# Patient Record
Sex: FEMALE | Race: WHITE | NOT HISPANIC OR LATINO | Employment: FULL TIME | URBAN - METROPOLITAN AREA
[De-identification: names, ages, dates, MRNs, and addresses within clinical notes are randomized per-mention and may not be internally consistent; named-entity substitution may affect disease eponyms.]

---

## 2021-04-08 DIAGNOSIS — Z23 ENCOUNTER FOR IMMUNIZATION: ICD-10-CM

## 2022-06-03 ENCOUNTER — OFFICE VISIT (OUTPATIENT)
Dept: URGENT CARE | Facility: CLINIC | Age: 22
End: 2022-06-03
Payer: COMMERCIAL

## 2022-06-03 VITALS
HEART RATE: 89 BPM | TEMPERATURE: 98 F | DIASTOLIC BLOOD PRESSURE: 77 MMHG | SYSTOLIC BLOOD PRESSURE: 124 MMHG | WEIGHT: 210 LBS | OXYGEN SATURATION: 99 % | RESPIRATION RATE: 18 BRPM | HEIGHT: 63 IN | BODY MASS INDEX: 37.21 KG/M2

## 2022-06-03 DIAGNOSIS — Z48.02 ENCOUNTER FOR REMOVAL OF SUTURES: Primary | ICD-10-CM

## 2022-06-03 PROCEDURE — S9083 URGENT CARE CENTER GLOBAL: HCPCS

## 2022-06-03 PROCEDURE — G0382 LEV 3 HOSP TYPE B ED VISIT: HCPCS

## 2022-06-03 RX ORDER — FLUOXETINE HYDROCHLORIDE 20 MG/1
CAPSULE ORAL
COMMUNITY
Start: 2022-05-31

## 2022-06-03 RX ORDER — PROPRANOLOL HYDROCHLORIDE 10 MG/1
TABLET ORAL
COMMUNITY
Start: 2022-04-05

## 2022-06-03 RX ORDER — NORGESTIMATE AND ETHINYL ESTRADIOL 0.25-0.035
1 KIT ORAL DAILY
COMMUNITY
Start: 2022-05-04

## 2022-06-03 RX ORDER — HYDROXYZINE HYDROCHLORIDE 25 MG/1
TABLET, FILM COATED ORAL
COMMUNITY
Start: 2022-04-30

## 2022-06-03 RX ORDER — FLUOXETINE 10 MG/1
CAPSULE ORAL
COMMUNITY
Start: 2022-05-30

## 2022-06-03 NOTE — PROGRESS NOTES
[facility]        NAME: Freya Burnett is a 22 y o  female  : 2000    MRN: [id number]  DATE: Tania 3, 2022  TIME: 11:29 AM    Assessment and Plan   Encounter for removal of sutures [Z48 02]  1  Encounter for removal of sutures  Suture removal         Patient Instructions     Keep wound clean and dry  Follow up with PCP  Chief Complaint     Chief Complaint   Patient presents with    Suture / Staple Removal     Stiches were placed last Friday at [facility]         History of Present Illness       HPI   Freya Burnett is a 22 y o  female presents today for suture removal of her right thumb  Patient cut her thumb with a piece of metal 1 week ago and had 3 sutures placed at [facility]  Patient denies any thumb pain, drainage, redness, or fevers  She has been keeping the wound clean and dry occasionally wearing a Band-Aid, but mostly keeping open to air  Patient reports she is up-to-date on her tetanus  Review of Systems   Review of Systems   Constitutional: Negative for chills and fever  Musculoskeletal: Negative for arthralgias and joint swelling  Skin: Positive for wound  Negative for color change and rash  Neurological: Negative for weakness and numbness  Current Medications       Current Outpatient Medications:     FLUoxetine (PROzac) 10 mg capsule, , Disp: , Rfl:     FLUoxetine (PROzac) 20 mg capsule, , Disp: , Rfl:     hydrOXYzine HCL (ATARAX) 25 mg tablet, TAKE 1 TABLET (25 MG TOTAL) BY MOUTH 2 (TWO) TIMES A DAY AS NEEDED FOR ANXIETY (INSOMNIA)  , Disp: , Rfl:     Mel 0 25-35 MG-MCG per tablet, Take 1 tablet by mouth daily, Disp: , Rfl:     propranolol (INDERAL) 10 mg tablet, TAKE 1 TABLET BY MOUTH 2 TIMES A DAY AS NEEDED (ANXIETY)  , Disp: , Rfl:     Current Allergies     Allergies as of 2022 - Reviewed 2022   Allergen Reaction Noted    Cefdinir Rash 10/13/2015            The following portions of the patient's history were reviewed and updated as appropriate: allergies, current medications, past family history, past medical history, past social history, past surgical history and problem list      History reviewed  No pertinent past medical history  History reviewed  No pertinent surgical history  History reviewed  No pertinent family history  Medications have been verified  Objective   /77   Pulse 89   Temp 98 °F (36 7 °C)   Resp 18   Ht 5' 3" (1 6 m)   Wt 95 3 kg (210 lb)   SpO2 99%   BMI 37 20 kg/m²        Physical Exam     Physical Exam  Vitals and nursing note reviewed  Constitutional:       General: She is not in acute distress  Appearance: Normal appearance  Cardiovascular:      Rate and Rhythm: Normal rate and regular rhythm  Heart sounds: Normal heart sounds  Pulmonary:      Effort: Pulmonary effort is normal       Breath sounds: Normal breath sounds  No wheezing, rhonchi or rales  Skin:     General: Skin is warm and dry  Capillary Refill: Capillary refill takes less than 2 seconds  Findings: Laceration present  Psychiatric:         Behavior: Behavior normal  Behavior is cooperative  Suture removal    Date/Time: 6/3/2022 11:26 AM  Performed by: SUSSY Odonnell  Authorized by: SUSSY Odonnell   Universal Protocol:  Consent: Verbal consent obtained  Risks and benefits: risks, benefits and alternatives were discussed  Consent given by: patient  Time out: Immediately prior to procedure a "time out" was called to verify the correct patient, procedure, equipment, support staff and site/side marked as required    Patient understanding: patient states understanding of the procedure being performed  Patient consent: the patient's understanding of the procedure matches consent given  Procedure consent: procedure consent matches procedure scheduled  Relevant documents: relevant documents present and verified  Site marked: the operative site was marked  Patient identity confirmed: verbally with patient        Patient location:  Bedside  Location:     Laterality:  Right    Location:  Upper extremity    Upper extremity location:  Hand    Hand location:  R thumb  Procedure details: Tools used:  Suture removal kit    Wound appearance:  No sign(s) of infection, good wound healing, clean and pink    Number of sutures removed:  3  Post-procedure details:     Post-removal:  No dressing applied    Patient tolerance of procedure:   Tolerated well, no immediate complications

## 2022-09-14 ENCOUNTER — OFFICE VISIT (OUTPATIENT)
Dept: URGENT CARE | Facility: CLINIC | Age: 22
End: 2022-09-14
Payer: COMMERCIAL

## 2022-09-14 VITALS
TEMPERATURE: 97.3 F | HEART RATE: 86 BPM | HEIGHT: 64 IN | WEIGHT: 220 LBS | OXYGEN SATURATION: 98 % | BODY MASS INDEX: 37.56 KG/M2 | RESPIRATION RATE: 18 BRPM

## 2022-09-14 DIAGNOSIS — R50.9 FEVER, UNSPECIFIED FEVER CAUSE: ICD-10-CM

## 2022-09-14 DIAGNOSIS — J06.9 ACUTE URI: Primary | ICD-10-CM

## 2022-09-14 PROCEDURE — 99213 OFFICE O/P EST LOW 20 MIN: CPT | Performed by: PHYSICIAN ASSISTANT

## 2022-09-14 PROCEDURE — 87636 SARSCOV2 & INF A&B AMP PRB: CPT | Performed by: PHYSICIAN ASSISTANT

## 2022-09-14 PROCEDURE — S9088 SERVICES PROVIDED IN URGENT: HCPCS | Performed by: PHYSICIAN ASSISTANT

## 2022-09-14 NOTE — PATIENT INSTRUCTIONS
Infection appears viral   Recommend symptomatic treatment  Can take ibuprofen or tylenol as needed for pain or fever  Over the counter cough and cold medications to help with symptoms  Use salt water gargles for sore throat and throat lozenges  Cough drops as needed  Wash hands frequently to prevent the spread of infection  If not improving over the next 7-10 days, follow up with PCP  Symptoms may persist for 10-14 days  Quarantine until test results are back   Vitamin D3 2000 IU daily  Vitamin C 1g every 12 hours  Multivitamin daily  Fluids and rest  Flonase 2 sprays each nostril once daily  Zyrtec   Over the counter cold medication as needed such as mucinex  Ibuprofen and/or tylenol for fever, body aches  Follow up with PCP upon confirmation of a positive covid test   Proceed to  ER if symptoms worsen  Stay home except to get medical care    People who are mildly ill with COVID-19 are able to isolate at home during their illness  You should restrict activities outside your home, except for getting medical care  Do not go to work, school, or public areas  Avoid using public transportation, ride-sharing, or taxis  Separate yourself from other people     As much as possible, you should stay in a specific room and away from other people in your home  Also, you should use a separate bathroom, if available  Call ahead before visiting your doctor    If you have a medical appointment, call the healthcare provider and tell them that you have or may have COVID-19  This will help the healthcare providers office take steps to keep other people from getting infected or exposed  Wear a facemask    You should wear a facemask when you are around other people (e g , sharing a room or vehicle) or pets and before you enter a healthcare providers office   If you are not able to wear a facemask (for example, because it causes trouble breathing), then people who live with you should not stay in the same room with you, or they should wear a facemask if they enter your room  Monitor your symptoms    Seek prompt medical attention if your illness is worsening (e g , difficulty breathing)  Before seeking care, call your healthcare provider and tell them that you have, or are being evaluated for, COVID-19  Put on a facemask before you enter the facility  These steps will help the healthcare providers office to keep other people in the office or waiting room from getting infected or exposed  Ask your healthcare provider to call the local or state health department  Persons who are placed under active monitoring or facilitated self-monitoring should follow instructions provided by their local health department or occupational health professionals, as appropriate  If you have a medical emergency and need to call 911, notify the dispatch personnel that you have, or are being evaluated for COVID-19  If possible, put on a facemask before emergency medical services arrive  Please follow Quarantine instructions provided in your work or school note

## 2022-09-14 NOTE — LETTER
[place] [address]  Dept: [phone]    September 14, 2022    Patient: Alban Chisholm  YOB: 2000    Alban Chisholm was seen and evaluated at our [facility]  Please note if Covid and Flu tests are negative, they may return to work and/or school when fever free for 24 hours without the use of a fever reducing agent  If Covid or Flu test is positive, they may return to work and/or on 9/17/22, as this is 5 days from the onset of symptoms  Upon return, they must then adhere to strict masking for an additional 5 days      Sincerely,    Kwan Shaikh PA-C

## 2022-09-14 NOTE — PROGRESS NOTES
[facility]    NAME: Teresa Lane is a 22 y o  female  : 2000    MRN: [id number]  DATE: 2022  TIME: 3:00 PM    Assessment and Plan   Acute URI [J06 9]  1  Acute URI     2  Fever, unspecified fever cause  Covid/Flu-Office Collect       Patient Instructions   Patient Instructions   Infection appears viral   Recommend symptomatic treatment  Can take ibuprofen or tylenol as needed for pain or fever  Over the counter cough and cold medications to help with symptoms  Use salt water gargles for sore throat and throat lozenges  Cough drops as needed  Wash hands frequently to prevent the spread of infection  If not improving over the next 7-10 days, follow up with PCP  Symptoms may persist for 10-14 days  Quarantine until test results are back   Vitamin D3 2000 IU daily  Vitamin C 1g every 12 hours  Multivitamin daily  Fluids and rest  Flonase 2 sprays each nostril once daily  Zyrtec   Over the counter cold medication as needed such as mucinex  Ibuprofen and/or tylenol for fever, body aches  Follow up with PCP upon confirmation of a positive covid test   Proceed to  ER if symptoms worsen  Stay home except to get medical care    People who are mildly ill with COVID-19 are able to isolate at home during their illness  You should restrict activities outside your home, except for getting medical care  Do not go to work, school, or public areas  Avoid using public transportation, ride-sharing, or taxis  Separate yourself from other people     As much as possible, you should stay in a specific room and away from other people in your home  Also, you should use a separate bathroom, if available  Call ahead before visiting your doctor    If you have a medical appointment, call the healthcare provider and tell them that you have or may have COVID-19  This will help the healthcare providers office take steps to keep other people from getting infected or exposed      Wear a facemask    You should wear a facemask when you are around other people (e g , sharing a room or vehicle) or pets and before you enter a healthcare providers office  If you are not able to wear a facemask (for example, because it causes trouble breathing), then people who live with you should not stay in the same room with you, or they should wear a facemask if they enter your room  Monitor your symptoms    Seek prompt medical attention if your illness is worsening (e g , difficulty breathing)  Before seeking care, call your healthcare provider and tell them that you have, or are being evaluated for, COVID-19  Put on a facemask before you enter the facility  These steps will help the healthcare providers office to keep other people in the office or waiting room from getting infected or exposed  Ask your healthcare provider to call the local or [facility] health department  Persons who are placed under active monitoring or facilitated self-monitoring should follow instructions provided by their local health department or occupational health professionals, as appropriate  If you have a medical emergency and need to call 911, notify the dispatch personnel that you have, or are being evaluated for COVID-19  If possible, put on a facemask before emergency medical services arrive  Please follow Quarantine instructions provided in your work or school note  Chief Complaint     Chief Complaint   Patient presents with    Sinusitis     Started Sunday home test was negative        History of Present Illness   22year old female here with complaint of fever, cough, nasal congestion, sinus pressure  Symptoms started 3 days ago  At home covid test day 1 and 3 negative  Review of Systems   Review of Systems   Constitutional: Positive for chills and fever  Negative for appetite change  HENT: Positive for congestion, postnasal drip, sinus pressure and sore throat   Negative for ear discharge, ear pain, facial swelling and sneezing  Respiratory: Negative for cough, shortness of breath and wheezing  Musculoskeletal: Positive for myalgias  Neurological: Positive for headaches  Current Medications     Current Outpatient Medications:     FLUoxetine (PROzac) 10 mg capsule, , Disp: , Rfl:     FLUoxetine (PROzac) 20 mg capsule, , Disp: , Rfl:     hydrOXYzine HCL (ATARAX) 25 mg tablet, TAKE 1 TABLET (25 MG TOTAL) BY MOUTH 2 (TWO) TIMES A DAY AS NEEDED FOR ANXIETY (INSOMNIA)  , Disp: , Rfl:     Mel 0 25-35 MG-MCG per tablet, Take 1 tablet by mouth daily, Disp: , Rfl:     propranolol (INDERAL) 10 mg tablet, TAKE 1 TABLET BY MOUTH 2 TIMES A DAY AS NEEDED (ANXIETY)  , Disp: , Rfl:     Current Allergies     Allergies as of 09/14/2022 - Reviewed 09/14/2022   Allergen Reaction Noted    Cefdinir Rash 10/13/2015          The following portions of the patient's history were reviewed and updated as appropriate: allergies, current medications, past family history, past medical history, past social history, past surgical history and problem list    History reviewed  No pertinent past medical history  History reviewed  No pertinent surgical history  History reviewed  No pertinent family history    Social History     Socioeconomic History    Marital status: Single     Spouse name: Not on file    Number of children: Not on file    Years of education: Not on file    Highest education level: Not on file   Occupational History    Not on file   Tobacco Use    Smoking status: Never Smoker    Smokeless tobacco: Never Used   Vaping Use    Vaping Use: Never used   Substance and Sexual Activity    Alcohol use: Not on file    Drug use: Not on file    Sexual activity: Not on file   Other Topics Concern    Not on file   Social History Narrative    Not on file     Social Determinants of Health     Financial Resource Strain: Not on file   Food Insecurity: Not on file   Transportation Needs: Not on file   Physical Activity: Not on file   Stress: Not on file   Social Connections: Not on file   Intimate Partner Violence: Not on file   Housing Stability: Not on file     Medications have been verified  Objective   Pulse 86   Temp (!) 97 3 °F (36 3 °C)   Resp 18   Ht 5' 4" (1 626 m)   Wt 99 8 kg (220 lb)   SpO2 98%   BMI 37 76 kg/m²      Physical Exam   Physical Exam  Vitals and nursing note reviewed  Constitutional:       General: She is not in acute distress  Appearance: She is well-developed  HENT:      Head: Normocephalic and atraumatic  Right Ear: Tympanic membrane normal       Left Ear: Tympanic membrane normal       Nose: Congestion and rhinorrhea present  No mucosal edema  Right Sinus: No maxillary sinus tenderness or frontal sinus tenderness  Left Sinus: No maxillary sinus tenderness or frontal sinus tenderness  Mouth/Throat:      Pharynx: No oropharyngeal exudate or posterior oropharyngeal erythema  Eyes:      Conjunctiva/sclera: Conjunctivae normal    Cardiovascular:      Rate and Rhythm: Normal rate and regular rhythm  Heart sounds: Normal heart sounds  No murmur heard

## 2022-09-15 LAB
FLUAV RNA RESP QL NAA+PROBE: NEGATIVE
FLUBV RNA RESP QL NAA+PROBE: NEGATIVE
SARS-COV-2 RNA RESP QL NAA+PROBE: NEGATIVE

## 2023-06-09 ENCOUNTER — HOSPITAL ENCOUNTER (EMERGENCY)
Facility: HOSPITAL | Age: 23
Discharge: HOME | End: 2023-06-09
Attending: EMERGENCY MEDICINE
Payer: OTHER MISCELLANEOUS

## 2023-06-09 VITALS
HEIGHT: 63 IN | RESPIRATION RATE: 16 BRPM | TEMPERATURE: 98.6 F | WEIGHT: 230 LBS | HEART RATE: 87 BPM | SYSTOLIC BLOOD PRESSURE: 164 MMHG | OXYGEN SATURATION: 96 % | DIASTOLIC BLOOD PRESSURE: 104 MMHG | BODY MASS INDEX: 40.75 KG/M2

## 2023-06-09 DIAGNOSIS — Z20.3 NEED FOR POST EXPOSURE PROPHYLAXIS FOR RABIES: Primary | ICD-10-CM
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
## 2023-06-09 PROCEDURE — 99282 EMERGENCY DEPT VISIT SF MDM: CPT

## 2023-06-09 PROCEDURE — 90375 RABIES IG IM/SC: CPT | Performed by: NURSE PRACTITIONER

## 2023-06-09 PROCEDURE — 90471 IMMUNIZATION ADMIN: CPT

## 2023-06-09 PROCEDURE — 90675 RABIES VACCINE IM: CPT | Performed by: NURSE PRACTITIONER

## 2023-06-09 PROCEDURE — 96372 THER/PROPH/DIAG INJ SC/IM: CPT

## 2023-06-09 RX ORDER — AMOXICILLIN AND CLAVULANATE POTASSIUM 875; 125 MG/1; MG/1
1 TABLET, FILM COATED ORAL 2 TIMES DAILY
Qty: 10 TABLET | Refills: 0 | Status: SHIPPED | OUTPATIENT
Start: 2023-06-09 | End: 2023-06-14

## 2023-06-09 RX ORDER — AMOXICILLIN AND CLAVULANATE POTASSIUM 875; 125 MG/1; MG/1
1 TABLET, FILM COATED ORAL ONCE
Status: COMPLETED | OUTPATIENT
Start: 2023-06-09 | End: 2023-06-09

## 2023-06-09 RX ADMIN — RABIES VIRUS STRAIN PM-1503-3M ANTIGEN (PROPIOLACTONE INACTIVATED) AND WATER 1 ML: KIT at 14:30

## 2023-06-09 RX ADMIN — AMOXICILLIN AND CLAVULANATE POTASSIUM 1 TABLET: 875; 125 TABLET, FILM COATED ORAL at 14:29

## 2023-06-09 RX ADMIN — RABIES IMMUNE GLOBULIN (HUMAN) 2070 UNITS: 300 INJECTION, SOLUTION INFILTRATION; INTRAMUSCULAR at 14:34

## 2023-06-09 NOTE — ED PROVIDER NOTES
History  Chief Complaint   Patient presents with   • Cat Bite     Pt reports a cat bite by an unvaccinated act at work on her right wrist         Cat Bite  Contact animal:  Cat  Location:  Shoulder/arm  Shoulder/arm injury location:  R wrist  Pain details:     Quality:  Dull    Severity:  Mild    Timing:  Constant    Progression:  Improving  Incident location:  Work  Provoked: provoked    Notifications:  None  Animal's rabies vaccination status:  Never received  Animal in possession: yes    Tetanus status:  Up to date  Relieved by:  None tried  Worsened by:  Nothing  Ineffective treatments:  None tried  Associated symptoms: no fever and no rash        Prior to Admission Medications   Prescriptions Last Dose Informant Patient Reported? Taking? FLUoxetine (PROzac) 10 mg capsule   Yes No   FLUoxetine (PROzac) 20 mg capsule   Yes No   Mel 0 25-35 MG-MCG per tablet   Yes No   Sig: Take 1 tablet by mouth daily   hydrOXYzine HCL (ATARAX) 25 mg tablet   Yes No   Sig: TAKE 1 TABLET (25 MG TOTAL) BY MOUTH 2 (TWO) TIMES A DAY AS NEEDED FOR ANXIETY (INSOMNIA)  propranolol (INDERAL) 10 mg tablet   Yes No   Sig: TAKE 1 TABLET BY MOUTH 2 TIMES A DAY AS NEEDED (ANXIETY)  Facility-Administered Medications: None       History reviewed  No pertinent past medical history  History reviewed  No pertinent surgical history  History reviewed  No pertinent family history  I have reviewed and agree with the history as documented  E-Cigarette/Vaping   • E-Cigarette Use Never User      E-Cigarette/Vaping Substances     Social History     Tobacco Use   • Smoking status: Never   • Smokeless tobacco: Never   Vaping Use   • Vaping Use: Never used   Substance Use Topics   • Drug use: Never       Review of Systems   Constitutional: Negative for diaphoresis, fatigue and fever  HENT: Negative for congestion, ear pain, nosebleeds and sore throat  Eyes: Negative for photophobia, pain, discharge and visual disturbance  Respiratory: Negative for cough, choking, chest tightness, shortness of breath and wheezing  Cardiovascular: Negative for chest pain and palpitations  Gastrointestinal: Negative for abdominal distention, abdominal pain, diarrhea and vomiting  Genitourinary: Negative for dysuria, flank pain and frequency  Musculoskeletal: Negative for back pain, gait problem and joint swelling  Skin: Positive for wound  Negative for color change and rash  Neurological: Negative for dizziness, syncope and headaches  Psychiatric/Behavioral: Negative for behavioral problems and confusion  The patient is not nervous/anxious  All other systems reviewed and are negative  Physical Exam  Physical Exam  Vitals and nursing note reviewed  Constitutional:       General: She is not in acute distress  Appearance: She is well-developed  She is not ill-appearing or toxic-appearing  HENT:      Head: Normocephalic and atraumatic  Mouth/Throat:      Dentition: Normal dentition  Eyes:      General:         Right eye: No discharge  Left eye: No discharge  Cardiovascular:      Rate and Rhythm: Normal rate and regular rhythm  Pulmonary:      Effort: Pulmonary effort is normal  No accessory muscle usage or respiratory distress  Abdominal:      General: There is no distension  Tenderness: There is no guarding  Musculoskeletal:         General: Normal range of motion  Cervical back: Normal range of motion and neck supple  Skin:     General: Skin is warm and dry  Comments: Single Small punctate puncture wound to the right wrist   Neurological:      Mental Status: She is alert and oriented to person, place, and time  Coordination: Coordination normal    Psychiatric:         Behavior: Behavior is cooperative           Vital Signs  ED Triage Vitals [06/09/23 1300]   Temperature Pulse Respirations Blood Pressure SpO2   98 6 °F (37 °C) 87 16 (!) 164/104 96 %      Temp Source Heart Rate Source Patient Position - Orthostatic VS BP Location FiO2 (%)   Oral Monitor Sitting Left arm --      Pain Score       --           Vitals:    06/09/23 1300   BP: (!) 164/104   Pulse: 87   Patient Position - Orthostatic VS: Sitting         Visual Acuity      ED Medications  Medications   rabies vaccine, human diploid IM injection 1 mL (1 mL Intramuscular Given 6/9/23 1430)   rabies immune globulin, human (HyperRAB) injection 2,070 Units (2,070 Units Infiltration Given 6/9/23 1434)   amoxicillin-clavulanate (AUGMENTIN) 875-125 mg per tablet 1 tablet (1 tablet Oral Given 6/9/23 1429)       Diagnostic Studies  Results Reviewed     None                 No orders to display              Procedures  Procedures         ED Course                                             Medical Decision Making  Although the puncture seems small and superficial   We will cover with antibiotics given the high risk nature of the wrist   Patient knows when to return for vaccination series    Cat bite, initial encounter: acute illness or injury  Need for post exposure prophylaxis for rabies: acute illness or injury  Risk  Prescription drug management  Disposition  Final diagnoses:   Need for post exposure prophylaxis for rabies   Cat bite, initial encounter     Time reflects when diagnosis was documented in both MDM as applicable and the Disposition within this note     Time User Action Codes Description Comment    6/9/2023  1:21 PM Christianne Frias Add [Z20 3] Need for post exposure prophylaxis for rabies     6/9/2023  1:21 PM Christianne Frias Add [W55 01XA] Cat bite, initial encounter       ED Disposition     ED Disposition   Discharge    Condition   Stable    Date/Time   Fri Jun 9, 2023  1:21 PM    Comment   [address] discharge to home/self care                 Follow-up Information    None         Discharge Medication List as of 6/9/2023  1:22 PM      START taking these medications    Details   amoxicillin-clavulanate (AUGMENTIN) 875-125 mg per tablet Take 1 tablet by mouth 2 (two) times a day for 5 days, Starting Fri 6/9/2023, Until Wed 6/14/2023, Normal         CONTINUE these medications which have NOT CHANGED    Details   !! FLUoxetine (PROzac) 10 mg capsule Starting Mon 5/30/2022, Historical Med      !! FLUoxetine (PROzac) 20 mg capsule Starting Tue 5/31/2022, Historical Med      hydrOXYzine HCL (ATARAX) 25 mg tablet TAKE 1 TABLET (25 MG TOTAL) BY MOUTH 2 (TWO) TIMES A DAY AS NEEDED FOR ANXIETY (INSOMNIA)  , Historical Med      Mel 0 25-35 MG-MCG per tablet Take 1 tablet by mouth daily, Starting Wed 5/4/2022, Historical Med      propranolol (INDERAL) 10 mg tablet TAKE 1 TABLET BY MOUTH 2 TIMES A DAY AS NEEDED (ANXIETY)  , Historical Med       !! - Potential duplicate medications found  Please discuss with provider  No discharge procedures on file      PDMP Review     None          ED Provider  Electronically Signed by           SUSSY Orellana  06/09/23 9982

## 2023-06-09 NOTE — Clinical Note
Brenna Dey was seen and treated in our emergency department on 6/9/2023  Diagnosis:     Jorge Simmons  is off the rest of the shift today  She may return on this date: If you have any questions or concerns, please don't hesitate to call        SUSSY Pak    ______________________________           _______________          _______________  Hospital Representative                              Date                                Time

## 2023-06-12 ENCOUNTER — HOSPITAL ENCOUNTER (EMERGENCY)
Facility: HOSPITAL | Age: 23
Discharge: HOME | End: 2023-06-12
Attending: EMERGENCY MEDICINE | Admitting: EMERGENCY MEDICINE
Payer: OTHER MISCELLANEOUS

## 2023-06-12 VITALS
SYSTOLIC BLOOD PRESSURE: 140 MMHG | OXYGEN SATURATION: 96 % | TEMPERATURE: 97.9 F | RESPIRATION RATE: 18 BRPM | DIASTOLIC BLOOD PRESSURE: 72 MMHG | WEIGHT: 229.28 LBS | HEART RATE: 77 BPM | BODY MASS INDEX: 40.61 KG/M2

## 2023-06-12 DIAGNOSIS — Z23 NEED FOR RABIES VACCINATION: Primary | ICD-10-CM

## 2023-06-12 PROCEDURE — 90675 RABIES VACCINE IM: CPT | Performed by: EMERGENCY MEDICINE

## 2023-06-12 RX ADMIN — Medication 1 ML: at 13:40

## 2023-06-12 NOTE — DISCHARGE INSTRUCTIONS
The recommended rabies vaccination schedule is day 0, 3, 7, and 14 Return to ED on 6/16 and 6/23 for repeat rabies vaccinations

## 2023-06-12 NOTE — ED PROVIDER NOTES
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 Taking? FLUoxetine (PROzac) 10 mg capsule   Yes No   FLUoxetine (PROzac) 20 mg capsule   Yes No   Mel 0 25-35 MG-MCG per tablet   Yes No   Sig: Take 1 tablet by mouth daily   amoxicillin-clavulanate (AUGMENTIN) 875-125 mg per tablet   No No   Sig: Take 1 tablet by mouth 2 (two) times a day for 5 days   hydrOXYzine HCL (ATARAX) 25 mg tablet   Yes No   Sig: TAKE 1 TABLET (25 MG TOTAL) BY MOUTH 2 (TWO) TIMES A DAY AS NEEDED FOR ANXIETY (INSOMNIA)  propranolol (INDERAL) 10 mg tablet   Yes No   Sig: TAKE 1 TABLET BY MOUTH 2 TIMES A DAY AS NEEDED (ANXIETY)  Facility-Administered Medications: None       History reviewed  No pertinent past medical history  History reviewed  No pertinent surgical history  History reviewed  No pertinent family history  I have reviewed and agree with the history as documented  E-Cigarette/Vaping   • E-Cigarette Use Never User      E-Cigarette/Vaping Substances     Social History     Tobacco Use   • Smoking status: Never   • Smokeless tobacco: Never   Vaping Use   • Vaping Use: Never used   Substance Use Topics   • Drug use: Never       Review of Systems   Constitutional: Negative for chills and fever  HENT: Negative for dental problem and ear pain  Eyes: Negative for pain and redness  Respiratory: Negative for cough and shortness of breath  Cardiovascular: Negative for chest pain and palpitations  Gastrointestinal: Negative for abdominal pain and nausea     Endocrine: Negative for polydipsia and polyphagia  Genitourinary: Negative for dysuria and frequency  Musculoskeletal: Negative for arthralgias and joint swelling  Skin: Positive for wound  Negative for color change and rash  Neurological: Negative for dizziness and headaches  Psychiatric/Behavioral: Negative for behavioral problems and confusion  All other systems reviewed and are negative  Physical Exam  Physical Exam  Vitals and nursing note reviewed  Constitutional:       General: She is not in acute distress  HENT:      Head: Normocephalic and atraumatic  Right Ear: External ear normal       Left Ear: External ear normal       Nose: Nose normal    Eyes:      General: No scleral icterus  Cardiovascular:      Rate and Rhythm: Normal rate  Pulmonary:      Effort: Pulmonary effort is normal  No respiratory distress  Abdominal:      General: There is no distension  Musculoskeletal:         General: No deformity  Normal range of motion  Comments: Healing wound to R forearm   Skin:     Findings: No rash  Neurological:      General: No focal deficit present  Mental Status: She is alert        Gait: Gait normal    Psychiatric:         Mood and Affect: Mood normal          Vital Signs  ED Triage Vitals [06/12/23 1302]   Temperature Pulse Respirations Blood Pressure SpO2   97 9 °F (36 6 °C) 77 18 140/72 96 %      Temp src Heart Rate Source Patient Position - Orthostatic VS BP Location FiO2 (%)   -- Monitor -- -- --      Pain Score       --           Vitals:    06/12/23 1302   BP: 140/72   Pulse: 77         Visual Acuity      ED Medications  Medications   rabies vaccine, human diploid IM injection 1 mL (has no administration in time range)       Diagnostic Studies  Results Reviewed     None                 No orders to display              Procedures  Procedures         ED Course                                             MDM  Patient given second dose of rabies vaccination, given schedule for next dose   Disposition  Final diagnoses:   Need for rabies vaccination     Time reflects when diagnosis was documented in both MDM as applicable and the Disposition within this note     Time User Action Codes Description Comment    6/12/2023  1:21 PM Dominik Benítez Add [Z23] Need for rabies vaccination       ED Disposition     ED Disposition   Discharge    Condition   Stable    Date/Time   Mon Jun 12, 2023  1:21 PM    [address] discharge to home/self care  Follow-up Information     Follow up With Specialties Details Why Contact Info Additional Information    [facility] Emergency Department Emergency Medicine   [address] Emergency Department, [address] [place] [address], [facility]          Patient's Medications   Discharge Prescriptions    No medications on file       No discharge procedures on file      PDMP Review     None          ED Provider  Electronically Signed by           Lyric Chicas MD  06/12/23 3526

## 2023-06-16 ENCOUNTER — HOSPITAL ENCOUNTER (EMERGENCY)
Facility: HOSPITAL | Age: 23
Discharge: HOME | End: 2023-06-16
Attending: FAMILY MEDICINE
Payer: COMMERCIAL

## 2023-06-16 VITALS
WEIGHT: 229 LBS | BODY MASS INDEX: 40.57 KG/M2 | HEART RATE: 73 BPM | TEMPERATURE: 96.7 F | OXYGEN SATURATION: 95 % | SYSTOLIC BLOOD PRESSURE: 129 MMHG | HEIGHT: 63 IN | RESPIRATION RATE: 16 BRPM | DIASTOLIC BLOOD PRESSURE: 88 MMHG

## 2023-06-16 DIAGNOSIS — Z23 NEED FOR RABIES VACCINATION: Primary | ICD-10-CM

## 2023-06-16 PROCEDURE — 90471 IMMUNIZATION ADMIN: CPT

## 2023-06-16 PROCEDURE — 90675 RABIES VACCINE IM: CPT

## 2023-06-16 RX ADMIN — RABIES VIRUS STRAIN PM-1503-3M ANTIGEN (PROPIOLACTONE INACTIVATED) AND WATER 1 ML: KIT at 07:38

## 2023-06-16 NOTE — ED PROVIDER NOTES
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 Taking? FLUoxetine (PROzac) 10 mg capsule   Yes No   FLUoxetine (PROzac) 20 mg capsule   Yes No   Mel 0 25-35 MG-MCG per tablet   Yes No   Sig: Take 1 tablet by mouth daily   hydrOXYzine HCL (ATARAX) 25 mg tablet   Yes No   Sig: TAKE 1 TABLET (25 MG TOTAL) BY MOUTH 2 (TWO) TIMES A DAY AS NEEDED FOR ANXIETY (INSOMNIA)  propranolol (INDERAL) 10 mg tablet   Yes No   Sig: TAKE 1 TABLET BY MOUTH 2 TIMES A DAY AS NEEDED (ANXIETY)  Facility-Administered Medications: None       History reviewed  No pertinent past medical history  History reviewed  No pertinent surgical history  History reviewed  No pertinent family history  I have reviewed and agree with the history as documented  E-Cigarette/Vaping   • E-Cigarette Use Never User      E-Cigarette/Vaping Substances     Social History     Tobacco Use   • Smoking status: Never   • Smokeless tobacco: Never   Vaping Use   • Vaping Use: Never used   Substance Use Topics   • Drug use: Never       Review of Systems   Constitutional: Negative  Negative for chills and fever  HENT: Negative for rhinorrhea and sore throat  Eyes: Negative for visual disturbance  Respiratory: Negative for cough and shortness of breath  Cardiovascular: Negative for chest pain and leg swelling  Gastrointestinal: Negative for abdominal pain, diarrhea, nausea and vomiting  Genitourinary: Negative for dysuria     Musculoskeletal: Negative for back pain and myalgias  Skin: Negative for rash  Neurological: Negative for dizziness and headaches  Psychiatric/Behavioral: Negative for confusion  All other systems reviewed and are negative  Physical Exam  Physical Exam  Vitals and nursing note reviewed  Constitutional:       Appearance: She is well-developed  HENT:      Head: Normocephalic and atraumatic  Right Ear: External ear normal       Left Ear: External ear normal       Nose: Nose normal       Mouth/Throat:      Mouth: Mucous membranes are moist       Pharynx: No oropharyngeal exudate  Eyes:      General: No scleral icterus  Right eye: No discharge  Left eye: No discharge  Conjunctiva/sclera: Conjunctivae normal       Pupils: Pupils are equal, round, and reactive to light  Cardiovascular:      Rate and Rhythm: Normal rate and regular rhythm  Pulses: Normal pulses  Heart sounds: Normal heart sounds  Pulmonary:      Effort: Pulmonary effort is normal  No respiratory distress  Breath sounds: Normal breath sounds  No wheezing  Musculoskeletal:         General: Normal range of motion  Cervical back: Normal range of motion and neck supple  Comments: Left arm: band aid applied  No redness noted    Lymphadenopathy:      Cervical: No cervical adenopathy  Skin:     General: Skin is warm and dry  Capillary Refill: Capillary refill takes less than 2 seconds  Neurological:      General: No focal deficit present  Mental Status: She is alert and oriented to person, place, and time     Psychiatric:         Mood and Affect: Mood normal          Behavior: Behavior normal          Vital Signs  ED Triage Vitals [06/16/23 0729]   Temperature Pulse Respirations Blood Pressure SpO2   (!) 96 7 °F (35 9 °C) 73 16 129/88 95 %      Temp Source Heart Rate Source Patient Position - Orthostatic VS BP Location FiO2 (%)   Tympanic Monitor Sitting Left arm --      Pain Score       No Pain Vitals:    06/16/23 0729   BP: 129/88   Pulse: 73   Patient Position - Orthostatic VS: Sitting         Visual Acuity      ED Medications  Medications   rabies vaccine, human diploid IM injection 1 mL (1 mL Intramuscular Given 6/16/23 6538)       Diagnostic Studies  Results Reviewed     None                 No orders to display              Procedures  Procedures         ED Course                               SBIRT 22yo+    Flowsheet Row Most Recent Value   Initial Alcohol Screen: US AUDIT-C     1  How often do you have a drink containing alcohol? 0 Filed at: 06/16/2023 0732   2  How many drinks containing alcohol do you have on a typical day you are drinking? 0 Filed at: 06/16/2023 0732   3b  FEMALE Any Age, or MALE 65+: How often do you have 4 or more drinks on one occassion? 0 Filed at: 06/16/2023 0732   Audit-C Score 0 Filed at: 06/16/2023 3911   [place]: How many times in the past year have you    Used an illegal drug or used a prescription medication for non-medical reasons? Never Filed at: 06/16/2023 0732                    Medical Decision Making  76-year-old female who is here for rabies vaccine  Dose was given  Patient is scheduled for next vaccine  Risk  Prescription drug management  Disposition  Final diagnoses:   Need for rabies vaccination     Time reflects when diagnosis was documented in both MDM as applicable and the Disposition within this note     Time User Action Codes Description Comment    6/16/2023  7:11 AM Antonia Hancock Add [Z23] Need for rabies vaccination       ED Disposition     ED Disposition   Discharge    Condition   Stable    Date/Time   Fri Jun 16, 2023  7:46 AM    Comment   Jaquan Keyes discharge to home/self care                 Follow-up Information     Follow up With Specialties Details Why Erika Conley 37, CRNP Nurse Practitioner Schedule an appointment as soon as possible for a visit in 2 days If symptoms worsen [address] [place] [address] [address] [facility]            Current Discharge Medication List      CONTINUE these medications which have NOT CHANGED    Details   !! FLUoxetine (PROzac) 10 mg capsule       !! FLUoxetine (PROzac) 20 mg capsule       hydrOXYzine HCL (ATARAX) 25 mg tablet TAKE 1 TABLET (25 MG TOTAL) BY MOUTH 2 (TWO) TIMES A DAY AS NEEDED FOR ANXIETY (INSOMNIA)  Mel 0 25-35 MG-MCG per tablet Take 1 tablet by mouth daily      propranolol (INDERAL) 10 mg tablet TAKE 1 TABLET BY MOUTH 2 TIMES A DAY AS NEEDED (ANXIETY)  !! - Potential duplicate medications found  Please discuss with provider  No discharge procedures on file      PDMP Review     None          ED Provider  Electronically Signed by           Maria Teresa Mariee MD  06/16/23 5881

## 2023-06-23 ENCOUNTER — HOSPITAL ENCOUNTER (EMERGENCY)
Facility: HOSPITAL | Age: 23
Discharge: HOME | End: 2023-06-23
Attending: EMERGENCY MEDICINE
Payer: COMMERCIAL

## 2023-06-23 VITALS
RESPIRATION RATE: 16 BRPM | OXYGEN SATURATION: 98 % | DIASTOLIC BLOOD PRESSURE: 78 MMHG | HEART RATE: 89 BPM | TEMPERATURE: 97.4 F | SYSTOLIC BLOOD PRESSURE: 141 MMHG

## 2023-06-23 DIAGNOSIS — Z23 NEED FOR RABIES VACCINATION: Primary | ICD-10-CM

## 2023-06-23 PROCEDURE — 90471 IMMUNIZATION ADMIN: CPT

## 2023-06-23 PROCEDURE — 90675 RABIES VACCINE IM: CPT | Performed by: EMERGENCY MEDICINE

## 2023-06-23 RX ADMIN — Medication 1 ML: at 10:03

## 2023-06-23 NOTE — ED PROVIDER NOTES
History  Chief Complaint   Patient presents with   • Follow Up Rabies     71-year-old female presents for rabies vaccination after cat bite 2 weeks ago  This is her final vaccination  Last vaccination was left deltoid  No issues  She had mild right-sided deltoid discomfort after that injection which was 2 injections ago  Wound is well-appearing  Prior to Admission Medications   Prescriptions Last Dose Informant Patient Reported? Taking? FLUoxetine (PROzac) 10 mg capsule   Yes No   FLUoxetine (PROzac) 20 mg capsule   Yes No   Mel 0 25-35 MG-MCG per tablet   Yes No   Sig: Take 1 tablet by mouth daily   hydrOXYzine HCL (ATARAX) 25 mg tablet   Yes No   Sig: TAKE 1 TABLET (25 MG TOTAL) BY MOUTH 2 (TWO) TIMES A DAY AS NEEDED FOR ANXIETY (INSOMNIA)  propranolol (INDERAL) 10 mg tablet   Yes No   Sig: TAKE 1 TABLET BY MOUTH 2 TIMES A DAY AS NEEDED (ANXIETY)  Facility-Administered Medications: None       History reviewed  No pertinent past medical history  History reviewed  No pertinent surgical history  History reviewed  No pertinent family history  I have reviewed and agree with the history as documented  E-Cigarette/Vaping   • E-Cigarette Use Never User      E-Cigarette/Vaping Substances     Social History     Tobacco Use   • Smoking status: Never   • Smokeless tobacco: Never   Vaping Use   • Vaping Use: Never used   Substance Use Topics   • Drug use: Never       Review of Systems    Physical Exam  Physical Exam  Vitals and nursing note reviewed  Constitutional:       Appearance: She is normal weight  HENT:      Head: Normocephalic and atraumatic  Eyes:      Conjunctiva/sclera: Conjunctivae normal       Pupils: Pupils are equal, round, and reactive to light  Cardiovascular:      Rate and Rhythm: Normal rate and regular rhythm  Pulmonary:      Effort: Pulmonary effort is normal  No respiratory distress  Abdominal:      General: Abdomen is flat  There is no distension  Musculoskeletal:         General: No swelling or deformity  Cervical back: Normal range of motion and neck supple  Skin:     General: Skin is dry  Coloration: Skin is not jaundiced  Neurological:      General: No focal deficit present  Mental Status: She is alert and oriented to person, place, and time  Psychiatric:         Mood and Affect: Mood normal          Thought Content: Thought content normal          Vital Signs  ED Triage Vitals [06/23/23 1001]   Temperature Pulse Respirations Blood Pressure SpO2   (!) 97 4 °F (36 3 °C) 89 16 141/78 98 %      Temp Source Heart Rate Source Patient Position - Orthostatic VS BP Location FiO2 (%)   Tympanic Monitor -- Left arm --      Pain Score       --           Vitals:    06/23/23 1001   BP: 141/78   Pulse: 89         Visual Acuity      ED Medications  Medications   rabies vaccine, human diploid IM injection 1 mL (1 mL Intramuscular Given 6/23/23 1003)       Diagnostic Studies  Results Reviewed     None                 No orders to display              Procedures  Procedures         ED Course                                             Medical Decision Making  61-year-old female presents for completion of rabies vaccination series  Considered cat bite cellulitis however wound is well-appearing and improved per patient  Considered vaccine related reaction however she tolerated the last vaccine well  Patient has no signs or symptoms to suggest active rabies    Will administer vaccine in ED    Need for rabies vaccination: acute illness or injury  Risk  Prescription drug management            Disposition  Final diagnoses:   Need for rabies vaccination     Time reflects when diagnosis was documented in both MDM as applicable and the Disposition within this note     Time User Action Codes Description Comment    6/23/2023  9:53 AM Robyn Bowie Add [Z23] Need for rabies vaccination       ED Disposition     ED Disposition   Discharge    Condition   Stable Date/Time   Fri Jun 23, 2023  9:55 AM    Comment   Salena Sacks Schuck discharge to home/self care  Follow-up Information     Follow up With Specialties Details Why Erika Conley 37, CRNP Nurse Practitioner  As needed [address]  [phone]            Discharge Medication List as of 6/23/2023  9:56 AM      CONTINUE these medications which have NOT CHANGED    Details   !! FLUoxetine (PROzac) 10 mg capsule Starting Mon 5/30/2022, Historical Med      !! FLUoxetine (PROzac) 20 mg capsule Starting Tue 5/31/2022, Historical Med      hydrOXYzine HCL (ATARAX) 25 mg tablet TAKE 1 TABLET (25 MG TOTAL) BY MOUTH 2 (TWO) TIMES A DAY AS NEEDED FOR ANXIETY (INSOMNIA)  , Historical Med      Mel 0 25-35 MG-MCG per tablet Take 1 tablet by mouth daily, Starting Wed 5/4/2022, Historical Med      propranolol (INDERAL) 10 mg tablet TAKE 1 TABLET BY MOUTH 2 TIMES A DAY AS NEEDED (ANXIETY)  , Historical Med       !! - Potential duplicate medications found  Please discuss with provider  No discharge procedures on file      PDMP Review     None          ED Provider  Electronically Signed by           Eduardo Parra DO  06/23/23 7801

## 2023-10-09 ENCOUNTER — APPOINTMENT (EMERGENCY)
Dept: RADIOLOGY | Facility: HOSPITAL | Age: 23
End: 2023-10-09
Payer: COMMERCIAL

## 2023-10-09 ENCOUNTER — HOSPITAL ENCOUNTER (EMERGENCY)
Facility: HOSPITAL | Age: 23
Discharge: HOME | End: 2023-10-09
Attending: EMERGENCY MEDICINE | Admitting: EMERGENCY MEDICINE
Payer: COMMERCIAL

## 2023-10-09 VITALS
RESPIRATION RATE: 18 BRPM | TEMPERATURE: 97.7 F | HEART RATE: 88 BPM | OXYGEN SATURATION: 94 % | WEIGHT: 229 LBS | DIASTOLIC BLOOD PRESSURE: 95 MMHG | BODY MASS INDEX: 40.57 KG/M2 | SYSTOLIC BLOOD PRESSURE: 174 MMHG

## 2023-10-09 DIAGNOSIS — R19.7 DIARRHEA: ICD-10-CM

## 2023-10-09 DIAGNOSIS — K29.70 GASTRITIS: Primary | ICD-10-CM

## 2023-10-09 LAB
ALBUMIN SERPL BCP-MCNC: 3.8 G/DL (ref 3.5–5)
ALP SERPL-CCNC: 57 U/L (ref 34–104)
ALT SERPL W P-5'-P-CCNC: 18 U/L (ref 7–52)
ANION GAP SERPL CALCULATED.3IONS-SCNC: 10 MMOL/L
AST SERPL W P-5'-P-CCNC: 18 U/L (ref 13–39)
BASOPHILS # BLD AUTO: 0.06 THOUSANDS/ÂΜL (ref 0–0.1)
BASOPHILS NFR BLD AUTO: 1 % (ref 0–1)
BILIRUB SERPL-MCNC: 0.24 MG/DL (ref 0.2–1)
BILIRUB UR QL STRIP: NEGATIVE
BUN SERPL-MCNC: 10 MG/DL (ref 5–25)
CALCIUM SERPL-MCNC: 9 MG/DL (ref 8.4–10.2)
CHLORIDE SERPL-SCNC: 103 MMOL/L (ref 96–108)
CLARITY UR: CLEAR
CO2 SERPL-SCNC: 23 MMOL/L (ref 21–32)
COLOR UR: YELLOW
CREAT SERPL-MCNC: 0.73 MG/DL (ref 0.6–1.3)
EOSINOPHIL # BLD AUTO: 0.31 THOUSAND/ÂΜL (ref 0–0.61)
EOSINOPHIL NFR BLD AUTO: 3 % (ref 0–6)
ERYTHROCYTE [DISTWIDTH] IN BLOOD BY AUTOMATED COUNT: 13.1 % (ref 11.6–15.1)
EXT PREGNANCY TEST URINE: NEGATIVE
EXT. CONTROL: NORMAL
GFR SERPL CREATININE-BSD FRML MDRD: 117 ML/MIN/1.73SQ M
GLUCOSE SERPL-MCNC: 111 MG/DL (ref 65–140)
GLUCOSE UR STRIP-MCNC: NEGATIVE MG/DL
HCT VFR BLD AUTO: 39.8 % (ref 34.8–46.1)
HGB BLD-MCNC: 13.1 G/DL (ref 11.5–15.4)
HGB UR QL STRIP.AUTO: NEGATIVE
IMM GRANULOCYTES # BLD AUTO: 0.07 THOUSAND/UL (ref 0–0.2)
IMM GRANULOCYTES NFR BLD AUTO: 1 % (ref 0–2)
KETONES UR STRIP-MCNC: NEGATIVE MG/DL
LEUKOCYTE ESTERASE UR QL STRIP: NEGATIVE
LIPASE SERPL-CCNC: 23 U/L (ref 11–82)
LYMPHOCYTES # BLD AUTO: 2.56 THOUSANDS/ÂΜL (ref 0.6–4.47)
LYMPHOCYTES NFR BLD AUTO: 28 % (ref 14–44)
MAGNESIUM SERPL-MCNC: 1.7 MG/DL (ref 1.9–2.7)
MCH RBC QN AUTO: 27.6 PG (ref 26.8–34.3)
MCHC RBC AUTO-ENTMCNC: 32.9 G/DL (ref 31.4–37.4)
MCV RBC AUTO: 84 FL (ref 82–98)
MONOCYTES # BLD AUTO: 0.8 THOUSAND/ÂΜL (ref 0.17–1.22)
MONOCYTES NFR BLD AUTO: 9 % (ref 4–12)
NEUTROPHILS # BLD AUTO: 5.4 THOUSANDS/ÂΜL (ref 1.85–7.62)
NEUTS SEG NFR BLD AUTO: 58 % (ref 43–75)
NITRITE UR QL STRIP: NEGATIVE
NRBC BLD AUTO-RTO: 0 /100 WBCS
PH UR STRIP.AUTO: 7.5 [PH]
PLATELET # BLD AUTO: 294 THOUSANDS/UL (ref 149–390)
PMV BLD AUTO: 10.7 FL (ref 8.9–12.7)
POTASSIUM SERPL-SCNC: 4 MMOL/L (ref 3.5–5.3)
PROT SERPL-MCNC: 6.6 G/DL (ref 6.4–8.4)
PROT UR STRIP-MCNC: NEGATIVE MG/DL
RBC # BLD AUTO: 4.74 MILLION/UL (ref 3.81–5.12)
SODIUM SERPL-SCNC: 136 MMOL/L (ref 135–147)
SP GR UR STRIP.AUTO: 1.01
UROBILINOGEN UR QL STRIP.AUTO: 0.2 E.U./DL
WBC # BLD AUTO: 9.2 THOUSAND/UL (ref 4.31–10.16)

## 2023-10-09 PROCEDURE — 71045 X-RAY EXAM CHEST 1 VIEW: CPT

## 2023-10-09 PROCEDURE — 87493 C DIFF AMPLIFIED PROBE: CPT | Performed by: EMERGENCY MEDICINE

## 2023-10-09 PROCEDURE — 99283 EMERGENCY DEPT VISIT LOW MDM: CPT

## 2023-10-09 PROCEDURE — 87177 OVA AND PARASITES SMEARS: CPT | Performed by: EMERGENCY MEDICINE

## 2023-10-09 PROCEDURE — 83735 ASSAY OF MAGNESIUM: CPT | Performed by: EMERGENCY MEDICINE

## 2023-10-09 PROCEDURE — 83690 ASSAY OF LIPASE: CPT | Performed by: EMERGENCY MEDICINE

## 2023-10-09 PROCEDURE — 87209 SMEAR COMPLEX STAIN: CPT | Performed by: EMERGENCY MEDICINE

## 2023-10-09 PROCEDURE — 80053 COMPREHEN METABOLIC PANEL: CPT | Performed by: EMERGENCY MEDICINE

## 2023-10-09 PROCEDURE — 81003 URINALYSIS AUTO W/O SCOPE: CPT | Performed by: EMERGENCY MEDICINE

## 2023-10-09 PROCEDURE — 85025 COMPLETE CBC W/AUTO DIFF WBC: CPT | Performed by: EMERGENCY MEDICINE

## 2023-10-09 PROCEDURE — 99284 EMERGENCY DEPT VISIT MOD MDM: CPT | Performed by: EMERGENCY MEDICINE

## 2023-10-09 PROCEDURE — 81025 URINE PREGNANCY TEST: CPT | Performed by: EMERGENCY MEDICINE

## 2023-10-09 PROCEDURE — 36415 COLL VENOUS BLD VENIPUNCTURE: CPT | Performed by: EMERGENCY MEDICINE

## 2023-10-09 PROCEDURE — 87505 NFCT AGENT DETECTION GI: CPT | Performed by: EMERGENCY MEDICINE

## 2023-10-09 RX ORDER — PANTOPRAZOLE SODIUM 20 MG/1
20 TABLET, DELAYED RELEASE ORAL DAILY
Qty: 28 TABLET | Refills: 0 | Status: SHIPPED | OUTPATIENT
Start: 2023-10-09 | End: 2023-10-16

## 2023-10-09 RX ORDER — MAGNESIUM HYDROXIDE/ALUMINUM HYDROXICE/SIMETHICONE 120; 1200; 1200 MG/30ML; MG/30ML; MG/30ML
30 SUSPENSION ORAL ONCE
Status: COMPLETED | OUTPATIENT
Start: 2023-10-09 | End: 2023-10-09

## 2023-10-09 RX ADMIN — ALUMINUM HYDROXIDE, MAGNESIUM HYDROXIDE, AND DIMETHICONE 30 ML: 200; 20; 200 SUSPENSION ORAL at 11:37

## 2023-10-09 NOTE — ED PROVIDER NOTES
History  Chief Complaint   Patient presents with   • Hemoptysis     Patient arrives with complaints of sore throat that started yesterday and this morning vomited up dark blood clots. States has had dark stools x 2-3 months. Denies abdominal pain. 70-year-old female presenting with nausea vomiting since today. Patient says that she woke up and had a feeling in her throat and started vomiting noticed streaks of blood in it. She says that she has had diarrhea for the last several weeks. Denies sick contacts. She does notice that the diarrhea is been dark in color over the last several weeks as well. No she does work as a , no known parasite contact. Prior to Admission Medications   Prescriptions Last Dose Informant Patient Reported? Taking? FLUoxetine (PROzac) 10 mg capsule   Yes No   FLUoxetine (PROzac) 20 mg capsule   Yes No   Mel 0.25-35 MG-MCG per tablet   Yes No   Sig: Take 1 tablet by mouth daily   hydrOXYzine HCL (ATARAX) 25 mg tablet   Yes No   Sig: TAKE 1 TABLET (25 MG TOTAL) BY MOUTH 2 (TWO) TIMES A DAY AS NEEDED FOR ANXIETY (INSOMNIA). propranolol (INDERAL) 10 mg tablet   Yes No   Sig: TAKE 1 TABLET BY MOUTH 2 TIMES A DAY AS NEEDED (ANXIETY). Facility-Administered Medications: None       History reviewed. No pertinent past medical history. History reviewed. No pertinent surgical history. History reviewed. No pertinent family history. I have reviewed and agree with the history as documented. E-Cigarette/Vaping   • E-Cigarette Use Never User      E-Cigarette/Vaping Substances     Social History     Tobacco Use   • Smoking status: Never   • Smokeless tobacco: Never   Vaping Use   • Vaping Use: Never used   Substance Use Topics   • Drug use: Never       Review of Systems    Physical Exam  Physical Exam  Vitals and nursing note reviewed. Constitutional:       Appearance: Normal appearance. She is well-developed. HENT:      Head: Normocephalic and atraumatic. Eyes:      Conjunctiva/sclera: Conjunctivae normal.      Pupils: Pupils are equal, round, and reactive to light. Neck:      Trachea: No tracheal deviation. Cardiovascular:      Rate and Rhythm: Normal rate and regular rhythm. Heart sounds: Normal heart sounds. No murmur heard. Pulmonary:      Effort: Pulmonary effort is normal. No respiratory distress. Breath sounds: Normal breath sounds. No wheezing or rales. Abdominal:      General: Bowel sounds are normal. There is no distension. Palpations: Abdomen is soft. Tenderness: There is no abdominal tenderness. Musculoskeletal:         General: No deformity. Cervical back: Normal range of motion and neck supple. Skin:     General: Skin is warm and dry. Capillary Refill: Capillary refill takes less than 2 seconds. Neurological:      Mental Status: She is alert and oriented to person, place, and time. Sensory: No sensory deficit. Psychiatric:         Mood and Affect: Mood normal.         Judgment: Judgment normal.         Vital Signs  ED Triage Vitals   Temperature Pulse Respirations Blood Pressure SpO2   10/09/23 1007 10/09/23 1007 10/09/23 1007 10/09/23 1010 10/09/23 1007   97.7 °F (36.5 °C) 88 18 (!) 174/95 94 %      Temp Source Heart Rate Source Patient Position - Orthostatic VS BP Location FiO2 (%)   10/09/23 1007 10/09/23 1007 10/09/23 1007 10/09/23 1007 --   Tympanic Monitor Sitting Left arm       Pain Score       10/09/23 1007       6           Vitals:    10/09/23 1007 10/09/23 1010   BP:  (!) 174/95   Pulse: 88    Patient Position - Orthostatic VS: Sitting          Visual Acuity      ED Medications  Medications   aluminum-magnesium hydroxide-simethicone (MAALOX) oral suspension 30 mL (30 mL Oral Given 10/9/23 1137)       Diagnostic Studies  Results Reviewed     Procedure Component Value Units Date/Time    Stool Enteric Bacterial Panel by PCR [[id number]] Collected: 10/09/23 9720    Lab Status:  In process Specimen: Stool from Per Rectum Updated: 10/09/23 1250    Ova and parasite examination [[id number]] Collected: 10/09/23 1244    Lab Status: In process Specimen: Stool from Rectum Updated: 10/09/23 1250    Clostridium difficile toxin by PCR with EIA [[id number]] Collected: 10/09/23 1244    Lab Status:  In process Specimen: Stool from Per Rectum Updated: 10/09/23 1250    Comprehensive metabolic panel [[id number]] Collected: 10/09/23 1026    Lab Status: Final result Specimen: Blood from Arm, Left Updated: 10/09/23 1049     Sodium 136 mmol/L      Potassium 4.0 mmol/L      Chloride 103 mmol/L      CO2 23 mmol/L      ANION GAP 10 mmol/L      BUN 10 mg/dL      Creatinine 0.73 mg/dL      Glucose 111 mg/dL      Calcium 9.0 mg/dL      AST 18 U/L      ALT 18 U/L      Alkaline Phosphatase 57 U/L      Total Protein 6.6 g/dL      Albumin 3.8 g/dL      Total Bilirubin 0.24 mg/dL      eGFR 117 ml/min/1.73sq m     Narrative:      [place] guidelines for Chronic Kidney Disease (CKD):   •  Stage 1 with normal or high GFR (GFR > 90 mL/min/1.73 square meters)  •  Stage 2 Mild CKD (GFR = 60-89 mL/min/1.73 square meters)  •  Stage 3A Moderate CKD (GFR = 45-59 mL/min/1.73 square meters)  •  Stage 3B Moderate CKD (GFR = 30-44 mL/min/1.73 square meters)  •  Stage 4 Severe CKD (GFR = 15-29 mL/min/1.73 square meters)  •  Stage 5 End Stage CKD (GFR <15 mL/min/1.73 square meters)  Note: GFR calculation is accurate only with a steady state creatinine    Magnesium [[id number]]  (Abnormal) Collected: 10/09/23 1026    Lab Status: Final result Specimen: Blood from Arm, Left Updated: 10/09/23 1049     Magnesium 1.7 mg/dL     Lipase [[id number]]  (Normal) Collected: 10/09/23 1026    Lab Status: Final result Specimen: Blood from Arm, Left Updated: 10/09/23 1049     Lipase 23 u/L     UA w Reflex to Microscopic w Reflex to Culture [[id number]]  (Normal) Collected: 10/09/23 1032    Lab Status: Final result Specimen: Urine, Clean Catch Updated: 10/09/23 1040     Color, UA Yellow     Clarity, UA Clear     Specific Gravity, UA 1.015     pH, UA 7.5     Leukocytes, UA Negative     Nitrite, UA Negative     Protein, UA Negative mg/dl      Glucose, UA Negative mg/dl      Ketones, UA Negative mg/dl      Urobilinogen, UA 0.2 E.U./dl      Bilirubin, UA Negative     Occult Blood, UA Negative    POCT pregnancy, urine [[id number]]  (Normal) Resulted: 10/09/23 1036    Lab Status: Final result Updated: 10/09/23 1036     EXT Preg Test, Ur Negative     Control Valid    CBC and differential [[id number]] Collected: 10/09/23 1026    Lab Status: Final result Specimen: Blood from Arm, Left Updated: 10/09/23 1032     WBC 9.20 Thousand/uL      RBC 4.74 Million/uL      Hemoglobin 13.1 g/dL      Hematocrit 39.8 %      MCV 84 fL      MCH 27.6 pg      MCHC 32.9 g/dL      RDW 13.1 %      MPV 10.7 fL      Platelets 576 Thousands/uL      nRBC 0 /100 WBCs      Neutrophils Relative 58 %      Immat GRANS % 1 %      Lymphocytes Relative 28 %      Monocytes Relative 9 %      Eosinophils Relative 3 %      Basophils Relative 1 %      Neutrophils Absolute 5.40 Thousands/µL      Immature Grans Absolute 0.07 Thousand/uL      Lymphocytes Absolute 2.56 Thousands/µL      Monocytes Absolute 0.80 Thousand/µL      Eosinophils Absolute 0.31 Thousand/µL      Basophils Absolute 0.06 Thousands/µL                  XR chest 1 view portable   Final Result by Glendy Harry MD (10/09 1256)      No acute cardiopulmonary disease. Workstation performed: [id number]                    Procedures  Procedures         ED Course  ED Course as of 10/09/23 1559   Mon Oct 09, 2023   1141 Hemoglobin: 13.1                               SBIRT 20yo+    Flowsheet Row Most Recent Value   Initial Alcohol Screen: US AUDIT-C     1. How often do you have a drink containing alcohol? 0 Filed at: 10/09/2023 1009   2. How many drinks containing alcohol do you have on a typical day you are drinking?   0 Filed at: 10/09/2023 1009   3a. Male UNDER 65: How often do you have five or more drinks on one occasion? 0 Filed at: 10/09/2023 1009   3b. FEMALE Any Age, or MALE 65+: How often do you have 4 or more drinks on one occassion? 0 Filed at: 10/09/2023 1009   Audit-C Score 0 Filed at: 10/09/2023 1009   [place]: How many times in the past year have you. .. Used an illegal drug or used a prescription medication for non-medical reasons? Never Filed at: 10/09/2023 1009                    Medical Decision Making  41-year-old female presenting with episode of vomiting with some blood in it. Likely gastritis/mucosal lining based on the description. No risk factors for severe upper gastrointestinal bleeding however peptic ulcer disease also possible. Her abdomen is soft she has no abdominal pain. I doubt Vanda-Leavitt tear or Boerhaave syndrome. Chest x-ray ordered as a screening for pneumomediastinitis; he was reviewed and interpreted independent by me as no acute disease noted. CBC unremarkable for anemia or significant leukocytosis. Chemistry also unremarkable no evidence of electrolyte abnormalities such as hyperkalemia. Patient will require follow-up with GI. We will start patient on Protonix for presumed gastritis. Patient is comfortable with this plan. Does not require admission at this time. Diarrhea: acute illness or injury  Gastritis: acute illness or injury  Amount and/or Complexity of Data Reviewed  Labs: ordered. Decision-making details documented in ED Course. Radiology: ordered and independent interpretation performed. Decision-making details documented in ED Course. Risk  OTC drugs. Prescription drug management. Decision regarding hospitalization.           Disposition  Final diagnoses:   Gastritis   Diarrhea     Time reflects when diagnosis was documented in both MDM as applicable and the Disposition within this note     Time User Action Codes Description Comment    10/9/2023 12:06 PM Jordan Villafana Yariel Seth Add [K29.70] Gastritis     10/9/2023 12:14 PM Valorie De Dios Add [R19.7] Diarrhea       ED Disposition     ED Disposition   Discharge    Condition   Stable    Date/Time   Mon Oct 9, 2023 12:06 PM    Comment   [facility] discharge to home/self care. Follow-up Information     Follow up With Specialties Details Why [facility], [place] Nurse Practitioner Schedule an appointment as soon as possible for a visit   [address]  DO [facility] Schedule an appointment as soon as possible for a visit   [facility] [id number]  [phone]            Discharge Medication List as of 10/9/2023 12:15 PM      START taking these medications    Details   pantoprazole (PROTONIX) 20 mg tablet Take 1 tablet (20 mg total) by mouth daily for 28 days, Starting Mon 10/9/2023, Until Mon 11/6/2023, Normal         CONTINUE these medications which have NOT CHANGED    Details   !! FLUoxetine (PROzac) 10 mg capsule Starting Mon 5/30/2022, Historical Med      !! FLUoxetine (PROzac) 20 mg capsule Starting Tue 5/31/2022, Historical Med      hydrOXYzine HCL (ATARAX) 25 mg tablet TAKE 1 TABLET (25 MG TOTAL) BY MOUTH 2 (TWO) TIMES A DAY AS NEEDED FOR ANXIETY (INSOMNIA). , Historical Med      Mel 0.25-35 MG-MCG per tablet Take 1 tablet by mouth daily, Starting Wed 5/4/2022, Historical Med      propranolol (INDERAL) 10 mg tablet TAKE 1 TABLET BY MOUTH 2 TIMES A DAY AS NEEDED (ANXIETY). , Historical Med       !! - Potential duplicate medications found. Please discuss with provider. Outpatient Discharge Orders   Ova and parasite examination   Standing Status: Future Standing Exp. Date: 10/09/24     Stool Enteric Bacterial Panel by PCR   Standing Status: Future Standing Exp.  Date: 10/09/24     Clostridium difficile toxin by PCR with EIA   Standing Status: Future Standing Exp. Date: 10/09/24       PDMP Review     None          ED Provider  Electronically Signed by           Esther Steel DO  10/09/23 1556

## 2023-10-09 NOTE — Clinical Note
Audrey Foote was seen and treated in our emergency department on 10/9/2023. No restrictions            Diagnosis:     Jessica  . She may return on this date: If you have any questions or concerns, please don't hesitate to call.       Bridgett Olguin, DO    ______________________________           _______________          _______________  Hospital Representative                              Date                                Time

## 2023-10-10 LAB
C DIFF TOX GENS STL QL NAA+PROBE: NEGATIVE
CAMPYLOBACTER DNA SPEC NAA+PROBE: NORMAL
SALMONELLA DNA SPEC QL NAA+PROBE: NORMAL
SHIGA TOXIN STX GENE SPEC NAA+PROBE: NORMAL
SHIGELLA DNA SPEC QL NAA+PROBE: NORMAL

## 2023-10-13 ENCOUNTER — APPOINTMENT (EMERGENCY)
Dept: RADIOLOGY | Facility: HOSPITAL | Age: 23
End: 2023-10-13
Payer: COMMERCIAL

## 2023-10-13 ENCOUNTER — HOSPITAL ENCOUNTER (EMERGENCY)
Facility: HOSPITAL | Age: 23
Discharge: HOME | End: 2023-10-14
Attending: STUDENT IN AN ORGANIZED HEALTH CARE EDUCATION/TRAINING PROGRAM
Payer: COMMERCIAL

## 2023-10-13 VITALS
RESPIRATION RATE: 16 BRPM | SYSTOLIC BLOOD PRESSURE: 158 MMHG | HEART RATE: 112 BPM | TEMPERATURE: 98.9 F | BODY MASS INDEX: 39.87 KG/M2 | OXYGEN SATURATION: 97 % | DIASTOLIC BLOOD PRESSURE: 89 MMHG | HEIGHT: 63 IN | WEIGHT: 225 LBS

## 2023-10-13 DIAGNOSIS — K29.70 GASTRITIS: Primary | ICD-10-CM

## 2023-10-13 LAB
ALBUMIN SERPL BCP-MCNC: 3.7 G/DL (ref 3.5–5)
ALP SERPL-CCNC: 56 U/L (ref 34–104)
ALT SERPL W P-5'-P-CCNC: 25 U/L (ref 7–52)
ANION GAP SERPL CALCULATED.3IONS-SCNC: 9 MMOL/L
AST SERPL W P-5'-P-CCNC: 21 U/L (ref 13–39)
B-HCG SERPL-ACNC: <1 MIU/ML (ref 0–5)
BASOPHILS # BLD AUTO: 0.08 THOUSANDS/ÂΜL (ref 0–0.1)
BASOPHILS NFR BLD AUTO: 1 % (ref 0–1)
BILIRUB SERPL-MCNC: 0.18 MG/DL (ref 0.2–1)
BUN SERPL-MCNC: 10 MG/DL (ref 5–25)
CALCIUM SERPL-MCNC: 8.6 MG/DL (ref 8.4–10.2)
CHLORIDE SERPL-SCNC: 106 MMOL/L (ref 96–108)
CO2 SERPL-SCNC: 23 MMOL/L (ref 21–32)
CREAT SERPL-MCNC: 0.84 MG/DL (ref 0.6–1.3)
EOSINOPHIL # BLD AUTO: 0.34 THOUSAND/ÂΜL (ref 0–0.61)
EOSINOPHIL NFR BLD AUTO: 3 % (ref 0–6)
ERYTHROCYTE [DISTWIDTH] IN BLOOD BY AUTOMATED COUNT: 13.2 % (ref 11.6–15.1)
GFR SERPL CREATININE-BSD FRML MDRD: 98 ML/MIN/1.73SQ M
GLUCOSE SERPL-MCNC: 117 MG/DL (ref 65–140)
HCT VFR BLD AUTO: 38.9 % (ref 34.8–46.1)
HGB BLD-MCNC: 12.7 G/DL (ref 11.5–15.4)
IMM GRANULOCYTES # BLD AUTO: 0.04 THOUSAND/UL (ref 0–0.2)
IMM GRANULOCYTES NFR BLD AUTO: 0 % (ref 0–2)
LIPASE SERPL-CCNC: 34 U/L (ref 11–82)
LYMPHOCYTES # BLD AUTO: 3.79 THOUSANDS/ÂΜL (ref 0.6–4.47)
LYMPHOCYTES NFR BLD AUTO: 33 % (ref 14–44)
MCH RBC QN AUTO: 27.8 PG (ref 26.8–34.3)
MCHC RBC AUTO-ENTMCNC: 32.6 G/DL (ref 31.4–37.4)
MCV RBC AUTO: 85 FL (ref 82–98)
MONOCYTES # BLD AUTO: 1.05 THOUSAND/ÂΜL (ref 0.17–1.22)
MONOCYTES NFR BLD AUTO: 9 % (ref 4–12)
NEUTROPHILS # BLD AUTO: 6.26 THOUSANDS/ÂΜL (ref 1.85–7.62)
NEUTS SEG NFR BLD AUTO: 54 % (ref 43–75)
NRBC BLD AUTO-RTO: 0 /100 WBCS
PLATELET # BLD AUTO: 291 THOUSANDS/UL (ref 149–390)
PMV BLD AUTO: 11.1 FL (ref 8.9–12.7)
POTASSIUM SERPL-SCNC: 3.6 MMOL/L (ref 3.5–5.3)
PROT SERPL-MCNC: 6 G/DL (ref 6.4–8.4)
RBC # BLD AUTO: 4.57 MILLION/UL (ref 3.81–5.12)
SODIUM SERPL-SCNC: 138 MMOL/L (ref 135–147)
WBC # BLD AUTO: 11.56 THOUSAND/UL (ref 4.31–10.16)

## 2023-10-13 PROCEDURE — 83690 ASSAY OF LIPASE: CPT | Performed by: STUDENT IN AN ORGANIZED HEALTH CARE EDUCATION/TRAINING PROGRAM

## 2023-10-13 PROCEDURE — 80053 COMPREHEN METABOLIC PANEL: CPT | Performed by: STUDENT IN AN ORGANIZED HEALTH CARE EDUCATION/TRAINING PROGRAM

## 2023-10-13 PROCEDURE — 71045 X-RAY EXAM CHEST 1 VIEW: CPT

## 2023-10-13 PROCEDURE — 36415 COLL VENOUS BLD VENIPUNCTURE: CPT | Performed by: STUDENT IN AN ORGANIZED HEALTH CARE EDUCATION/TRAINING PROGRAM

## 2023-10-13 PROCEDURE — 85025 COMPLETE CBC W/AUTO DIFF WBC: CPT | Performed by: STUDENT IN AN ORGANIZED HEALTH CARE EDUCATION/TRAINING PROGRAM

## 2023-10-13 PROCEDURE — 99285 EMERGENCY DEPT VISIT HI MDM: CPT | Performed by: STUDENT IN AN ORGANIZED HEALTH CARE EDUCATION/TRAINING PROGRAM

## 2023-10-13 PROCEDURE — 84702 CHORIONIC GONADOTROPIN TEST: CPT | Performed by: STUDENT IN AN ORGANIZED HEALTH CARE EDUCATION/TRAINING PROGRAM

## 2023-10-13 RX ORDER — SUCRALFATE 1 G/1
1 TABLET ORAL ONCE
Status: COMPLETED | OUTPATIENT
Start: 2023-10-13 | End: 2023-10-13

## 2023-10-13 RX ORDER — MAGNESIUM HYDROXIDE/ALUMINUM HYDROXICE/SIMETHICONE 120; 1200; 1200 MG/30ML; MG/30ML; MG/30ML
30 SUSPENSION ORAL ONCE
Status: COMPLETED | OUTPATIENT
Start: 2023-10-13 | End: 2023-10-13

## 2023-10-13 RX ORDER — FAMOTIDINE 20 MG/1
20 TABLET, FILM COATED ORAL 2 TIMES DAILY
Qty: 30 TABLET | Refills: 0 | Status: SHIPPED | OUTPATIENT
Start: 2023-10-13 | End: 2023-10-16

## 2023-10-13 RX ADMIN — SUCRALFATE 1 G: 1 TABLET ORAL at 22:49

## 2023-10-13 RX ADMIN — ALUMINUM HYDROXIDE, MAGNESIUM HYDROXIDE, AND DIMETHICONE 30 ML: 200; 20; 200 SUSPENSION ORAL at 22:49

## 2023-10-14 NOTE — ED PROVIDER NOTES
History  Chief Complaint   Patient presents with    Vomiting     Was seen 10/9 for gastritis. Same symptoms just worse. HPI this is a 70-year-old female who presents to the emergency department for approximately 1 week of progressively worsening "gastritis". Patient states she has been seen multiple times emergency recently 10/9 secondary to epigastric pain, nausea and what she describes as vomiting blood. She was seen evaluated on 10/19 diagnosed with gastritis sent home on PPI which she has been taking as instructed however states symptoms are getting worse. She has been unable to tolerate p.o. diet denies any alcohol consumption. Prior to Admission Medications   Prescriptions Last Dose Informant Patient Reported? Taking? FLUoxetine (PROzac) 10 mg capsule   Yes No   FLUoxetine (PROzac) 20 mg capsule   Yes No   Mel 0.25-35 MG-MCG per tablet   Yes No   Sig: Take 1 tablet by mouth daily   hydrOXYzine HCL (ATARAX) 25 mg tablet   Yes No   Sig: TAKE 1 TABLET (25 MG TOTAL) BY MOUTH 2 (TWO) TIMES A DAY AS NEEDED FOR ANXIETY (INSOMNIA). pantoprazole (PROTONIX) 20 mg tablet   No No   Sig: Take 1 tablet (20 mg total) by mouth daily for 28 days   propranolol (INDERAL) 10 mg tablet   Yes No   Sig: TAKE 1 TABLET BY MOUTH 2 TIMES A DAY AS NEEDED (ANXIETY). Facility-Administered Medications: None       History reviewed. No pertinent past medical history. History reviewed. No pertinent surgical history. History reviewed. No pertinent family history. I have reviewed and agree with the history as documented. E-Cigarette/Vaping    E-Cigarette Use Never User      E-Cigarette/Vaping Substances     Social History     Tobacco Use    Smoking status: Never    Smokeless tobacco: Never   Vaping Use    Vaping Use: Never used   Substance Use Topics    Drug use: Never       Review of Systems   Constitutional:  Negative for activity change, appetite change, chills, fatigue and fever.    HENT:  Negative for congestion, dental problem, drooling, ear discharge, ear pain, facial swelling, postnasal drip, rhinorrhea and sinus pain. Eyes:  Negative for photophobia, pain, discharge and itching. Respiratory:  Negative for apnea, cough, chest tightness and shortness of breath. Cardiovascular:  Negative for chest pain and leg swelling. Gastrointestinal:  Positive for abdominal pain, nausea and vomiting. Negative for abdominal distention, anal bleeding, constipation and diarrhea. Endocrine: Negative for cold intolerance, heat intolerance and polydipsia. Genitourinary:  Negative for difficulty urinating. Musculoskeletal:  Negative for arthralgias, gait problem, joint swelling and myalgias. Skin:  Negative for color change and pallor. Allergic/Immunologic: Negative for immunocompromised state. Neurological:  Negative for dizziness, seizures, facial asymmetry, weakness, light-headedness, numbness and headaches. Psychiatric/Behavioral:  Negative for agitation, behavioral problems, confusion, decreased concentration and dysphoric mood. All other systems reviewed and are negative. Physical Exam  Physical Exam  Vitals and nursing note reviewed. Constitutional:       General: She is not in acute distress. Appearance: She is well-developed. HENT:      Head: Normocephalic and atraumatic. Eyes:      Conjunctiva/sclera: Conjunctivae normal.      Pupils: Pupils are equal, round, and reactive to light. Cardiovascular:      Rate and Rhythm: Normal rate and regular rhythm. Heart sounds: Normal heart sounds. No murmur heard. No friction rub. Pulmonary:      Effort: Pulmonary effort is normal.      Breath sounds: Normal breath sounds. Abdominal:      General: Bowel sounds are normal.      Palpations: Abdomen is soft. Tenderness: There is abdominal tenderness. Comments: Belching   Musculoskeletal:         General: Normal range of motion.       Cervical back: Normal range of motion and neck supple. Skin:     General: Skin is warm. Capillary Refill: Capillary refill takes less than 2 seconds. Neurological:      Mental Status: She is alert and oriented to person, place, and time. Motor: No abnormal muscle tone. Coordination: Coordination normal.   Psychiatric:         Behavior: Behavior normal.         Thought Content: Thought content normal.         Vital Signs  ED Triage Vitals   Temperature Pulse Respirations Blood Pressure SpO2   10/13/23 2229 10/13/23 2225 10/13/23 2225 10/13/23 2225 10/13/23 2225   98.9 °F (37.2 °C) (!) 112 16 158/89 97 %      Temp Source Heart Rate Source Patient Position - Orthostatic VS BP Location FiO2 (%)   10/13/23 2229 -- 10/13/23 2225 10/13/23 2225 --   Tympanic  Sitting Left arm       Pain Score       10/13/23 2225       9           Vitals:    10/13/23 2225   BP: 158/89   Pulse: (!) 112   Patient Position - Orthostatic VS: Sitting         Visual Acuity      ED Medications  Medications   aluminum-magnesium hydroxide-simethicone (MAALOX) oral suspension 30 mL (has no administration in time range)   sucralfate (CARAFATE) tablet 1 g (has no administration in time range)       Diagnostic Studies  Results Reviewed       None                   No orders to display              Procedures  Procedures         ED Course  ED Course as of 10/13/23 2333   Fri Oct 13, 2023   2258 WBC(!): 11.56  Mild leukocytosis likely secondary to vomiting/dry heaving for the last several days. 2319 X-ray as interpreted by myself shows no acute intrathoracic abnormality negative pneumoperitoneum per imaging. We will proceed with work-up as planned   2320 Lipase: 29                                             Medical Decision Making  81-year-old female presents to the emergency department with several weeks of belching, epigastric pain and "vomiting blood ". Patient is hemodynamically stable physically well-appearing with mild tenderness in the epigastric region. Findings most consistent with gastritis however cannot rule out pneumoperitoneum or pancreatitis. Given she is 23 we will also order urine pregnancy. Low suspicion for any other acute intra-abdominal pathology given symptomatology and history. Will treat here with GI cocktail if successful will discharge Carafate, PPI, Maalox and encouraged follow-up with GI. Amount and/or Complexity of Data Reviewed  Independent Historian: spouse  External Data Reviewed: labs, radiology, ECG and notes. Labs: ordered. Decision-making details documented in ED Course. Radiology: ordered. Decision-making details documented in ED Course. ECG/medicine tests: ordered and independent interpretation performed. Decision-making details documented in ED Course. Risk  OTC drugs. Prescription drug management. Disposition  Final diagnoses:   None     ED Disposition       None          Follow-up Information    None         Patient's Medications   Discharge Prescriptions    No medications on file       No discharge procedures on file.     PDMP Review       None            ED Provider  Electronically Signed by             Sarah Cheung MD  10/13/23 8721

## 2023-10-16 ENCOUNTER — CONSULT (OUTPATIENT)
Dept: GASTROENTEROLOGY | Facility: MEDICAL CENTER | Age: 23
End: 2023-10-16
Payer: COMMERCIAL

## 2023-10-16 VITALS
HEART RATE: 79 BPM | DIASTOLIC BLOOD PRESSURE: 74 MMHG | SYSTOLIC BLOOD PRESSURE: 134 MMHG | WEIGHT: 235.2 LBS | TEMPERATURE: 97.6 F | BODY MASS INDEX: 41.66 KG/M2

## 2023-10-16 DIAGNOSIS — R14.2 BELCHING: ICD-10-CM

## 2023-10-16 DIAGNOSIS — R11.14 BILIOUS VOMITING WITH NAUSEA: ICD-10-CM

## 2023-10-16 DIAGNOSIS — K29.70 GASTRITIS: ICD-10-CM

## 2023-10-16 DIAGNOSIS — K92.0 HEMATEMESIS WITH NAUSEA: ICD-10-CM

## 2023-10-16 DIAGNOSIS — R10.11 RUQ PAIN: ICD-10-CM

## 2023-10-16 DIAGNOSIS — R19.7 DIARRHEA, UNSPECIFIED TYPE: Primary | ICD-10-CM

## 2023-10-16 PROCEDURE — 99204 OFFICE O/P NEW MOD 45 MIN: CPT | Performed by: STUDENT IN AN ORGANIZED HEALTH CARE EDUCATION/TRAINING PROGRAM

## 2023-10-16 RX ORDER — FAMOTIDINE 40 MG/1
40 TABLET, FILM COATED ORAL
Qty: 30 TABLET | Refills: 11 | Status: SHIPPED | OUTPATIENT
Start: 2023-10-16

## 2023-10-16 RX ORDER — ONDANSETRON 4 MG/1
4 TABLET, FILM COATED ORAL EVERY 8 HOURS PRN
Qty: 20 TABLET | Refills: 0 | Status: SHIPPED | OUTPATIENT
Start: 2023-10-16

## 2023-10-16 RX ORDER — OMEPRAZOLE 40 MG/1
40 CAPSULE, DELAYED RELEASE ORAL DAILY
Qty: 30 CAPSULE | Refills: 11 | Status: SHIPPED | OUTPATIENT
Start: 2023-10-16

## 2023-10-16 NOTE — PROGRESS NOTES
[facility] - Outpatient Consultation  Miguel Amaro 26 y.o. female MRN: [id number]  Encounter: [id number]          ASSESSMENT AND PLAN:    61-year-old female with BMI 42, depression here for several months of diarrhea and more recent nausea, vomiting, intermittent hematemesis. Symptoms concerning for infectious vs inflammatory etiology. C. difficile, O&P, stool enteric panel were all negative. Will check for Giardia. We will get abdominal ultrasound to assess for biliary pathology though LFTs unremarkable. We will check fecal calprotectin and get EGD and colonoscopy to assess for IBD, PUD, gastritis, esophagitis microscopic colitis, etc.  1. Gastritis  2. Bilious vomiting with nausea  3. Hematemesis with nausea  4. Belching  - Ambulatory Referral to Gastroenterology  - omeprazole (PriLOSEC) 40 MG capsule; Take 1 capsule (40 mg total) by mouth daily  Dispense: 30 capsule; Refill: 11  - famotidine (PEPCID) 40 MG tablet; Take 1 tablet (40 mg total) by mouth daily at bedtime  Dispense: 30 tablet; Refill: 11  - ondansetron (ZOFRAN) 4 mg tablet; Take 1 tablet (4 mg total) by mouth every 8 (eight) hours as needed for nausea or vomiting  Dispense: 20 tablet; Refill: 0  - EGD; Future    5. Diarrhea, unspecified type  - Giardia antigen; Future  - Calprotectin,Fecal; Future  - Colonoscopy; Future  - EGD; Future    If Giardia testing is positive, will re-assess need for EGD and colonoscopy    6. RUQ pain  - US abdomen complete; Future    ED precautions discussed including significant hematemesis, melena, symptomatic anemia, severe pain, dehydration, etc.  Patient will inform me if things worsen but not to the degree requiring ED presentation so that we can discuss whether CT abdomen and pelvis with IV and oral contrast are warranted.      ______________________________________________________________________    HPI:    For last 2-3 months, has been having 6-8 episodes of diarrhea a day. Works as . Having nocturnal BM and awakes in middle of night with RUQ pain. Feels like something is moving around and super painful. Usually improves with standing up. Stools have been dark and has thrown up coffee grounds 10 times in past week, one episode was blood (was having regular vomit beforehand). Lots of belching that doesn't stop. With any PO intake, has vomiting or diarrhea. Has lost 8# in last week. No sick contacts. Having a lot of pain between shoulders. 10/9/23 O&P, cdif, stool enteric. Unremarkable CBC, CMP, lipase    No family history of IBD      REVIEW OF SYSTEMS:    CONSTITUTIONAL: Denies any fever, chills, rigors, and weight loss. HEENT: No earache or tinnitus. Denies hearing loss or visual disturbances. CARDIOVASCULAR: No chest pain or palpitations. RESPIRATORY: Denies any cough, hemoptysis, shortness of breath or dyspnea on exertion. GASTROINTESTINAL: As noted in the History of Present Illness. GENITOURINARY: No problems with urination. Denies any hematuria or dysuria. NEUROLOGIC: No dizziness or vertigo, denies headaches. MUSCULOSKELETAL: Denies any muscle or joint pain. SKIN: Denies skin rashes or itching. ENDOCRINE: Denies excessive thirst. Denies intolerance to heat or cold. PSYCHOSOCIAL: Denies depression or anxiety. Denies any recent memory loss. Historical Information   History reviewed. No pertinent past medical history. History reviewed. No pertinent surgical history. Social History   Social History     Substance and Sexual Activity   Alcohol Use None     Social History     Substance and Sexual Activity   Drug Use Never     Social History     Tobacco Use   Smoking Status Never   Smokeless Tobacco Never     History reviewed. No pertinent family history.     Meds/Allergies       Current Outpatient Medications:     famotidine (PEPCID) 20 mg tablet    FLUoxetine (PROzac) 10 mg capsule    FLUoxetine (PROzac) 20 mg capsule    hydrOXYzine HCL (ATARAX) 25 mg tablet    Mel 0.25-35 MG-MCG per tablet    pantoprazole (PROTONIX) 20 mg tablet    propranolol (INDERAL) 10 mg tablet    Allergies   Allergen Reactions    Cefdinir Rash           Objective     Blood pressure 134/74, pulse 79, temperature 97.6 °F (36.4 °C), weight 107 kg (235 lb 3.2 oz), last menstrual period 09/19/2023. Body mass index is 41.66 kg/m². PHYSICAL EXAM:      General Appearance:   Alert, cooperative, no distress   HEENT:   Normocephalic, atraumatic, anicteric. Neck:  Supple, symmetrical, trachea midline   Lungs:   Clear to auscultation bilaterally; no rales, rhonchi or wheezing; respirations unlabored    Heart[de-identified]   Regular rate and rhythm; no murmur, rub, or gallop. Abdomen:   Soft, mild RUQ TTP, non-distended; normal bowel sounds; no masses, no organomegaly    Genitalia:   Deferred    Rectal:   Deferred    Extremities:  No cyanosis, clubbing or edema    Pulses:  2+ and symmetric    Skin:  No jaundice, rashes, or lesions    Lymph nodes:  No palpable cervical lymphadenopathy        Lab Results:   No visits with results within 1 Day(s) from this visit.    Latest known visit with results is:   Admission on 10/13/2023, Discharged on 10/14/2023   Component Date Value    HCG, Quant 10/13/2023 <1     WBC 10/13/2023 11.56 (H)     RBC 10/13/2023 4.57     Hemoglobin 10/13/2023 12.7     Hematocrit 10/13/2023 38.9     MCV 10/13/2023 85     MCH 10/13/2023 27.8     MCHC 10/13/2023 32.6     RDW 10/13/2023 13.2     MPV 10/13/2023 11.1     Platelets 93/26/6710 291     nRBC 10/13/2023 0     Neutrophils Relative 10/13/2023 54     Immat GRANS % 10/13/2023 0     Lymphocytes Relative 10/13/2023 33     Monocytes Relative 10/13/2023 9     Eosinophils Relative 10/13/2023 3     Basophils Relative 10/13/2023 1     Neutrophils Absolute 10/13/2023 6.26     Immature Grans Absolute 10/13/2023 0.04     Lymphocytes Absolute 10/13/2023 3.79     Monocytes Absolute 10/13/2023 1.05     Eosinophils Absolute 10/13/2023 0.34 Basophils Absolute 10/13/2023 0.08     Sodium 10/13/2023 138     Potassium 10/13/2023 3.6     Chloride 10/13/2023 106     CO2 10/13/2023 23     ANION GAP 10/13/2023 9     BUN 10/13/2023 10     Creatinine 10/13/2023 0.84     Glucose 10/13/2023 117     Calcium 10/13/2023 8.6     AST 10/13/2023 21     ALT 10/13/2023 25     Alkaline Phosphatase 10/13/2023 56     Total Protein 10/13/2023 6.0 (L)     Albumin 10/13/2023 3.7     Total Bilirubin 10/13/2023 0.18 (L)     eGFR 10/13/2023 98     Lipase 10/13/2023 34          Radiology Results:   XR chest 1 view portable    Result Date: 10/14/2023  Narrative: CHEST INDICATION: cough, possible pneumonia COMPARISON: 10/9/2023 EXAM PERFORMED/VIEWS:  XR CHEST PORTABLE Single view FINDINGS: Cardiomediastinal silhouette appears unremarkable. The lungs are clear. No pneumothorax or pleural effusion. Osseous structures appear within normal limits for patient age. Impression: No acute cardiopulmonary disease. Findings are stable Workstation performed: [id number]     XR chest 1 view portable    Result Date: 10/9/2023  Narrative: CHEST INDICATION:   vomiting chest discomfort, low clinical suspiscion for acute disease. COMPARISON:  None EXAM PERFORMED/VIEWS:  XR CHEST PORTABLE FINDINGS: Cardiomediastinal silhouette appears unremarkable. The lungs are clear. No pneumothorax or pleural effusion. Osseous structures appear within normal limits for patient age. Impression: No acute cardiopulmonary disease.  Workstation performed: [id number]   Answers submitted by the patient for this visit:  Abdominal Pain Questionnaire (Submitted on 10/16/2023)  Chief Complaint: Abdominal pain  Chronicity: recurrent  Onset: 1 to 4 weeks ago  Onset quality: sudden  Frequency: 2 to 4 times per day  Episode duration: 2 Hours  Progression since onset: gradually worsening  Pain location: RUQ  Pain - numeric: 8/10  Pain quality: aching, cramping, sharp, tearing  Radiates to: RUQ, right shoulder, back  anorexia: Yes  arthralgias: Yes  belching: Yes  constipation: No  diarrhea: Yes  dysuria: No  fever: Yes  flatus: Yes  frequency: Yes  headaches: Yes  hematochezia: No  hematuria: No  melena:  Yes  myalgias: Yes  nausea: Yes  weight loss: Yes  vomiting: Yes  Aggravated by: belching, bowel movement, certain positions, eating, movement, vomiting  Relieved by: certain positions, liquids, standing

## 2023-10-16 NOTE — H&P (VIEW-ONLY)
[facility] - Outpatient Consultation  Yesi Cassidy 26 y.o. female MRN: [id number]  Encounter: [id number]          ASSESSMENT AND PLAN:    71-year-old female with BMI 42, depression here for several months of diarrhea and more recent nausea, vomiting, intermittent hematemesis. Symptoms concerning for infectious vs inflammatory etiology. C. difficile, O&P, stool enteric panel were all negative. Will check for Giardia. We will get abdominal ultrasound to assess for biliary pathology though LFTs unremarkable. We will check fecal calprotectin and get EGD and colonoscopy to assess for IBD, PUD, gastritis, esophagitis microscopic colitis, etc.  1. Gastritis  2. Bilious vomiting with nausea  3. Hematemesis with nausea  4. Belching  - Ambulatory Referral to Gastroenterology  - omeprazole (PriLOSEC) 40 MG capsule; Take 1 capsule (40 mg total) by mouth daily  Dispense: 30 capsule; Refill: 11  - famotidine (PEPCID) 40 MG tablet; Take 1 tablet (40 mg total) by mouth daily at bedtime  Dispense: 30 tablet; Refill: 11  - ondansetron (ZOFRAN) 4 mg tablet; Take 1 tablet (4 mg total) by mouth every 8 (eight) hours as needed for nausea or vomiting  Dispense: 20 tablet; Refill: 0  - EGD; Future    5. Diarrhea, unspecified type  - Giardia antigen; Future  - Calprotectin,Fecal; Future  - Colonoscopy; Future  - EGD; Future    If Giardia testing is positive, will re-assess need for EGD and colonoscopy    6. RUQ pain  - US abdomen complete; Future    ED precautions discussed including significant hematemesis, melena, symptomatic anemia, severe pain, dehydration, etc.  Patient will inform me if things worsen but not to the degree requiring ED presentation so that we can discuss whether CT abdomen and pelvis with IV and oral contrast are warranted.      ______________________________________________________________________    HPI:    For last 2-3 months, has been having 6-8 episodes of diarrhea a day. Works as . Having nocturnal BM and awakes in middle of night with RUQ pain. Feels like something is moving around and super painful. Usually improves with standing up. Stools have been dark and has thrown up coffee grounds 10 times in past week, one episode was blood (was having regular vomit beforehand). Lots of belching that doesn't stop. With any PO intake, has vomiting or diarrhea. Has lost 8# in last week. No sick contacts. Having a lot of pain between shoulders. 10/9/23 O&P, cdif, stool enteric. Unremarkable CBC, CMP, lipase    No family history of IBD      REVIEW OF SYSTEMS:    CONSTITUTIONAL: Denies any fever, chills, rigors, and weight loss. HEENT: No earache or tinnitus. Denies hearing loss or visual disturbances. CARDIOVASCULAR: No chest pain or palpitations. RESPIRATORY: Denies any cough, hemoptysis, shortness of breath or dyspnea on exertion. GASTROINTESTINAL: As noted in the History of Present Illness. GENITOURINARY: No problems with urination. Denies any hematuria or dysuria. NEUROLOGIC: No dizziness or vertigo, denies headaches. MUSCULOSKELETAL: Denies any muscle or joint pain. SKIN: Denies skin rashes or itching. ENDOCRINE: Denies excessive thirst. Denies intolerance to heat or cold. PSYCHOSOCIAL: Denies depression or anxiety. Denies any recent memory loss. Historical Information   History reviewed. No pertinent past medical history. History reviewed. No pertinent surgical history. Social History   Social History     Substance and Sexual Activity   Alcohol Use None     Social History     Substance and Sexual Activity   Drug Use Never     Social History     Tobacco Use   Smoking Status Never   Smokeless Tobacco Never     History reviewed. No pertinent family history.     Meds/Allergies       Current Outpatient Medications:     famotidine (PEPCID) 20 mg tablet    FLUoxetine (PROzac) 10 mg capsule    FLUoxetine (PROzac) 20 mg capsule    hydrOXYzine HCL (ATARAX) 25 mg tablet    Mel 0.25-35 MG-MCG per tablet    pantoprazole (PROTONIX) 20 mg tablet    propranolol (INDERAL) 10 mg tablet    Allergies   Allergen Reactions    Cefdinir Rash           Objective     Blood pressure 134/74, pulse 79, temperature 97.6 °F (36.4 °C), weight 107 kg (235 lb 3.2 oz), last menstrual period 09/19/2023. Body mass index is 41.66 kg/m². PHYSICAL EXAM:      General Appearance:   Alert, cooperative, no distress   HEENT:   Normocephalic, atraumatic, anicteric. Neck:  Supple, symmetrical, trachea midline   Lungs:   Clear to auscultation bilaterally; no rales, rhonchi or wheezing; respirations unlabored    Heart[de-identified]   Regular rate and rhythm; no murmur, rub, or gallop. Abdomen:   Soft, mild RUQ TTP, non-distended; normal bowel sounds; no masses, no organomegaly    Genitalia:   Deferred    Rectal:   Deferred    Extremities:  No cyanosis, clubbing or edema    Pulses:  2+ and symmetric    Skin:  No jaundice, rashes, or lesions    Lymph nodes:  No palpable cervical lymphadenopathy        Lab Results:   No visits with results within 1 Day(s) from this visit.    Latest known visit with results is:   Admission on 10/13/2023, Discharged on 10/14/2023   Component Date Value    HCG, Quant 10/13/2023 <1     WBC 10/13/2023 11.56 (H)     RBC 10/13/2023 4.57     Hemoglobin 10/13/2023 12.7     Hematocrit 10/13/2023 38.9     MCV 10/13/2023 85     MCH 10/13/2023 27.8     MCHC 10/13/2023 32.6     RDW 10/13/2023 13.2     MPV 10/13/2023 11.1     Platelets 89/48/7790 291     nRBC 10/13/2023 0     Neutrophils Relative 10/13/2023 54     Immat GRANS % 10/13/2023 0     Lymphocytes Relative 10/13/2023 33     Monocytes Relative 10/13/2023 9     Eosinophils Relative 10/13/2023 3     Basophils Relative 10/13/2023 1     Neutrophils Absolute 10/13/2023 6.26     Immature Grans Absolute 10/13/2023 0.04     Lymphocytes Absolute 10/13/2023 3.79     Monocytes Absolute 10/13/2023 1.05     Eosinophils Absolute 10/13/2023 0.34 Basophils Absolute 10/13/2023 0.08     Sodium 10/13/2023 138     Potassium 10/13/2023 3.6     Chloride 10/13/2023 106     CO2 10/13/2023 23     ANION GAP 10/13/2023 9     BUN 10/13/2023 10     Creatinine 10/13/2023 0.84     Glucose 10/13/2023 117     Calcium 10/13/2023 8.6     AST 10/13/2023 21     ALT 10/13/2023 25     Alkaline Phosphatase 10/13/2023 56     Total Protein 10/13/2023 6.0 (L)     Albumin 10/13/2023 3.7     Total Bilirubin 10/13/2023 0.18 (L)     eGFR 10/13/2023 98     Lipase 10/13/2023 34          Radiology Results:   XR chest 1 view portable    Result Date: 10/14/2023  Narrative: CHEST INDICATION: cough, possible pneumonia COMPARISON: 10/9/2023 EXAM PERFORMED/VIEWS:  XR CHEST PORTABLE Single view FINDINGS: Cardiomediastinal silhouette appears unremarkable. The lungs are clear. No pneumothorax or pleural effusion. Osseous structures appear within normal limits for patient age. Impression: No acute cardiopulmonary disease. Findings are stable Workstation performed: [id number]     XR chest 1 view portable    Result Date: 10/9/2023  Narrative: CHEST INDICATION:   vomiting chest discomfort, low clinical suspiscion for acute disease. COMPARISON:  None EXAM PERFORMED/VIEWS:  XR CHEST PORTABLE FINDINGS: Cardiomediastinal silhouette appears unremarkable. The lungs are clear. No pneumothorax or pleural effusion. Osseous structures appear within normal limits for patient age. Impression: No acute cardiopulmonary disease.  Workstation performed: [id number]   Answers submitted by the patient for this visit:  Abdominal Pain Questionnaire (Submitted on 10/16/2023)  Chief Complaint: Abdominal pain  Chronicity: recurrent  Onset: 1 to 4 weeks ago  Onset quality: sudden  Frequency: 2 to 4 times per day  Episode duration: 2 Hours  Progression since onset: gradually worsening  Pain location: RUQ  Pain - numeric: 8/10  Pain quality: aching, cramping, sharp, tearing  Radiates to: RUQ, right shoulder, back  anorexia: Yes  arthralgias: Yes  belching: Yes  constipation: No  diarrhea: Yes  dysuria: No  fever: Yes  flatus: Yes  frequency: Yes  headaches: Yes  hematochezia: No  hematuria: No  melena:  Yes  myalgias: Yes  nausea: Yes  weight loss: Yes  vomiting: Yes  Aggravated by: belching, bowel movement, certain positions, eating, movement, vomiting  Relieved by: certain positions, liquids, standing

## 2023-10-17 ENCOUNTER — HOSPITAL ENCOUNTER (OUTPATIENT)
Dept: ULTRASOUND IMAGING | Facility: HOSPITAL | Age: 23
Discharge: HOME | End: 2023-10-17
Attending: STUDENT IN AN ORGANIZED HEALTH CARE EDUCATION/TRAINING PROGRAM
Payer: COMMERCIAL

## 2023-10-17 ENCOUNTER — APPOINTMENT (OUTPATIENT)
Dept: LAB | Facility: HOSPITAL | Age: 23
End: 2023-10-17
Payer: COMMERCIAL

## 2023-10-17 DIAGNOSIS — R10.11 RUQ PAIN: ICD-10-CM

## 2023-10-17 DIAGNOSIS — R19.7 DIARRHEA: ICD-10-CM

## 2023-10-17 DIAGNOSIS — R19.7 DIARRHEA, UNSPECIFIED TYPE: ICD-10-CM

## 2023-10-17 PROCEDURE — 83993 ASSAY FOR CALPROTECTIN FECAL: CPT

## 2023-10-17 PROCEDURE — 76700 US EXAM ABDOM COMPLETE: CPT

## 2023-10-17 PROCEDURE — 87329 GIARDIA AG IA: CPT

## 2023-10-18 ENCOUNTER — TELEPHONE (OUTPATIENT)
Dept: GASTROENTEROLOGY | Facility: MEDICAL CENTER | Age: 23
End: 2023-10-18

## 2023-10-18 LAB — G LAMBLIA AG STL QL IA: NEGATIVE

## 2023-10-20 LAB — CALPROTECTIN STL-MCNT: 20 UG/G (ref 0–120)

## 2023-10-25 DIAGNOSIS — R11.14 BILIOUS VOMITING WITH NAUSEA: ICD-10-CM

## 2023-10-25 DIAGNOSIS — K29.70 GASTRITIS: ICD-10-CM

## 2023-10-25 DIAGNOSIS — K92.0 HEMATEMESIS WITH NAUSEA: ICD-10-CM

## 2023-10-25 RX ORDER — ONDANSETRON 4 MG/1
4 TABLET, FILM COATED ORAL EVERY 8 HOURS PRN
Qty: 20 TABLET | Refills: 0 | Status: SHIPPED | OUTPATIENT
Start: 2023-10-25

## 2023-10-26 RX ORDER — SODIUM CHLORIDE, SODIUM LACTATE, POTASSIUM CHLORIDE, CALCIUM CHLORIDE 600; 310; 30; 20 MG/100ML; MG/100ML; MG/100ML; MG/100ML
50 INJECTION, SOLUTION INTRAVENOUS CONTINUOUS
Status: CANCELLED | OUTPATIENT
Start: 2023-10-26

## 2023-10-27 ENCOUNTER — ANESTHESIA EVENT (OUTPATIENT)
Dept: GASTROENTEROLOGY | Facility: HOSPITAL | Age: 23
End: 2023-10-27

## 2023-10-27 ENCOUNTER — HOSPITAL ENCOUNTER (OUTPATIENT)
Dept: GASTROENTEROLOGY | Facility: HOSPITAL | Age: 23
Setting detail: OUTPATIENT SURGERY
Discharge: HOME | End: 2023-10-27
Attending: STUDENT IN AN ORGANIZED HEALTH CARE EDUCATION/TRAINING PROGRAM
Payer: COMMERCIAL

## 2023-10-27 ENCOUNTER — ANESTHESIA (OUTPATIENT)
Dept: GASTROENTEROLOGY | Facility: HOSPITAL | Age: 23
End: 2023-10-27

## 2023-10-27 VITALS
SYSTOLIC BLOOD PRESSURE: 121 MMHG | OXYGEN SATURATION: 98 % | DIASTOLIC BLOOD PRESSURE: 63 MMHG | RESPIRATION RATE: 15 BRPM | HEART RATE: 63 BPM | TEMPERATURE: 97.3 F

## 2023-10-27 DIAGNOSIS — R19.7 DIARRHEA, UNSPECIFIED TYPE: ICD-10-CM

## 2023-10-27 DIAGNOSIS — K92.0 HEMATEMESIS WITH NAUSEA: ICD-10-CM

## 2023-10-27 LAB
EXT PREGNANCY TEST URINE: NEGATIVE
EXT. CONTROL: ABNORMAL

## 2023-10-27 PROCEDURE — 88305 TISSUE EXAM BY PATHOLOGIST: CPT | Performed by: PATHOLOGY

## 2023-10-27 PROCEDURE — 88341 IMHCHEM/IMCYTCHM EA ADD ANTB: CPT | Performed by: PATHOLOGY

## 2023-10-27 PROCEDURE — 81025 URINE PREGNANCY TEST: CPT | Performed by: ANESTHESIOLOGY

## 2023-10-27 PROCEDURE — 43239 EGD BIOPSY SINGLE/MULTIPLE: CPT | Performed by: STUDENT IN AN ORGANIZED HEALTH CARE EDUCATION/TRAINING PROGRAM

## 2023-10-27 PROCEDURE — 45380 COLONOSCOPY AND BIOPSY: CPT | Performed by: STUDENT IN AN ORGANIZED HEALTH CARE EDUCATION/TRAINING PROGRAM

## 2023-10-27 PROCEDURE — 88342 IMHCHEM/IMCYTCHM 1ST ANTB: CPT | Performed by: PATHOLOGY

## 2023-10-27 RX ORDER — SODIUM CHLORIDE, SODIUM LACTATE, POTASSIUM CHLORIDE, CALCIUM CHLORIDE 600; 310; 30; 20 MG/100ML; MG/100ML; MG/100ML; MG/100ML
50 INJECTION, SOLUTION INTRAVENOUS CONTINUOUS
Status: DISCONTINUED | OUTPATIENT
Start: 2023-10-27 | End: 2023-10-31 | Stop reason: HOSPADM

## 2023-10-27 RX ORDER — PROPOFOL 10 MG/ML
INJECTION, EMULSION INTRAVENOUS AS NEEDED
Status: DISCONTINUED | OUTPATIENT
Start: 2023-10-27 | End: 2023-10-27

## 2023-10-27 RX ORDER — LIDOCAINE HYDROCHLORIDE 10 MG/ML
INJECTION, SOLUTION EPIDURAL; INFILTRATION; INTRACAUDAL; PERINEURAL AS NEEDED
Status: DISCONTINUED | OUTPATIENT
Start: 2023-10-27 | End: 2023-10-27

## 2023-10-27 RX ORDER — SODIUM CHLORIDE, SODIUM LACTATE, POTASSIUM CHLORIDE, CALCIUM CHLORIDE 600; 310; 30; 20 MG/100ML; MG/100ML; MG/100ML; MG/100ML
INJECTION, SOLUTION INTRAVENOUS CONTINUOUS PRN
Status: DISCONTINUED | OUTPATIENT
Start: 2023-10-27 | End: 2023-10-27

## 2023-10-27 RX ADMIN — PROPOFOL 160 MCG/KG/MIN: 10 INJECTION, EMULSION INTRAVENOUS at 10:02

## 2023-10-27 RX ADMIN — SODIUM CHLORIDE, SODIUM LACTATE, POTASSIUM CHLORIDE, AND CALCIUM CHLORIDE 50 ML/HR: .6; .31; .03; .02 INJECTION, SOLUTION INTRAVENOUS at 08:41

## 2023-10-27 RX ADMIN — LIDOCAINE HYDROCHLORIDE 50 MG: 10 INJECTION, SOLUTION EPIDURAL; INFILTRATION; INTRACAUDAL; PERINEURAL at 10:00

## 2023-10-27 RX ADMIN — PROPOFOL 150 MG: 10 INJECTION, EMULSION INTRAVENOUS at 10:00

## 2023-10-27 RX ADMIN — PROPOFOL 50 MG: 10 INJECTION, EMULSION INTRAVENOUS at 10:01

## 2023-10-27 NOTE — ANESTHESIA POSTPROCEDURE EVALUATION
Called patient and LVM      Nicole Rubalcava MA     Post-Op Assessment Note    CV Status:  Stable  Pain Score: 0    Pain management: adequate     Mental Status:  Alert and awake   Hydration Status:  Euvolemic   PONV Controlled:  Controlled   Airway Patency:  Patent      Post Op Vitals Reviewed: Yes      Staff: CRNA         No notable events documented.     BP   135/81   Temp     Pulse 64   Resp 16   SpO2 100

## 2023-10-27 NOTE — ANESTHESIA PREPROCEDURE EVALUATION
Procedure:  COLONOSCOPY  EGD    Relevant Problems   No relevant active problems        Physical Exam    Airway    Mallampati score: II         Dental       Cardiovascular  Cardiovascular exam normal    Pulmonary  Pulmonary exam normal     Other Findings      Anesthesia Plan  ASA Score- 1     Anesthesia Type- IV sedation with anesthesia with ASA Monitors. Additional Monitors:     Airway Plan:            Plan Factors-Exercise tolerance (METS): >4 METS. Chart reviewed. EKG reviewed. Imaging results reviewed. Existing labs reviewed. Patient summary reviewed. Patient is not a current smoker. Patient not instructed to abstain from smoking on day of procedure. Patient did not smoke on day of surgery. Induction- intravenous. Postoperative Plan-     Informed Consent- Anesthetic plan and risks discussed with patient. I personally reviewed this patient with the CRNA. Discussed and agreed on the Anesthesia Plan with the CRNA. [place]

## 2023-10-30 ENCOUNTER — NURSE TRIAGE (OUTPATIENT)
Age: 23
End: 2023-10-30

## 2023-10-30 NOTE — TELEPHONE ENCOUNTER
Pt post colon/egd 10/27 and having bright red blood per rectum. She denies alarm symptoms. Biopsy results have not returned. I advised monitoring, sitz baths, bland diet, and go to ED if symptoms worsen     Answer Assessment - Initial Assessment Questions  1. APPEARANCE of BLOOD: "What color is it?" "Is it passed separately, on the surface of the stool, or mixed in with the stool?"       Bright red mixed with stool   2. AMOUNT: "How much blood was passed?"       A bit more than tablespoon of blood   3. FREQUENCY: "How many times has blood been passed with the stools?"       Today but pt notes some blood before and after colon but blood was passed separate   4. ONSET: "When was the blood first seen in the stools?" (Days or weeks)       Over month ago   5. DIARRHEA: "Is there also some diarrhea?" If Yes, ask: "How many diarrhea stools were passed in past 24 hours?"       Pt having some loose stools    6. CONSTIPATION: "Do you have constipation?" If Yes, ask: "How bad is it?"      Denies   7. RECURRENT SYMPTOMS: "Have you had blood in your stools before?" If Yes, ask: "When was the last time?" and "What happened that time?"       Yes pt has had blood in stool before, she stated last week it occurred and went away on its own   8. BLOOD THINNERS: "Do you take any blood thinners?" (e.g., Coumadin/warfarin, Pradaxa/dabigatran, aspirin)      Denies   9.  OTHER SYMPTOMS: "Do you have any other symptoms?"  (e.g., abdominal pain, vomiting, dizziness, fever)      Denies    Protocols used: Rectal Bleeding-ADULT-[place]

## 2023-11-03 PROCEDURE — 88341 IMHCHEM/IMCYTCHM EA ADD ANTB: CPT | Performed by: PATHOLOGY

## 2023-11-03 PROCEDURE — 88342 IMHCHEM/IMCYTCHM 1ST ANTB: CPT | Performed by: PATHOLOGY

## 2023-11-03 PROCEDURE — 88305 TISSUE EXAM BY PATHOLOGIST: CPT | Performed by: PATHOLOGY

## 2023-11-08 DIAGNOSIS — K29.70 GASTRITIS: ICD-10-CM

## 2023-11-08 DIAGNOSIS — R11.14 BILIOUS VOMITING WITH NAUSEA: ICD-10-CM

## 2023-11-08 DIAGNOSIS — K92.0 HEMATEMESIS WITH NAUSEA: ICD-10-CM

## 2023-11-08 RX ORDER — OMEPRAZOLE 40 MG/1
40 CAPSULE, DELAYED RELEASE ORAL DAILY
Qty: 90 CAPSULE | Refills: 1 | Status: SHIPPED | OUTPATIENT
Start: 2023-11-08

## 2023-11-08 RX ORDER — FAMOTIDINE 40 MG/1
40 TABLET, FILM COATED ORAL
Qty: 90 TABLET | Refills: 1 | Status: SHIPPED | OUTPATIENT
Start: 2023-11-08

## 2023-12-21 ENCOUNTER — APPOINTMENT (EMERGENCY)
Dept: CT IMAGING | Facility: HOSPITAL | Age: 23
End: 2023-12-21
Payer: COMMERCIAL

## 2023-12-21 ENCOUNTER — HOSPITAL ENCOUNTER (EMERGENCY)
Facility: HOSPITAL | Age: 23
Discharge: HOME | End: 2023-12-21
Attending: FAMILY MEDICINE
Payer: COMMERCIAL

## 2023-12-21 ENCOUNTER — NURSE TRIAGE (OUTPATIENT)
Age: 23
End: 2023-12-21

## 2023-12-21 VITALS
HEIGHT: 64 IN | TEMPERATURE: 97.4 F | WEIGHT: 215 LBS | DIASTOLIC BLOOD PRESSURE: 75 MMHG | SYSTOLIC BLOOD PRESSURE: 128 MMHG | BODY MASS INDEX: 36.7 KG/M2 | OXYGEN SATURATION: 97 % | HEART RATE: 81 BPM | RESPIRATION RATE: 18 BRPM

## 2023-12-21 DIAGNOSIS — N83.209 OVARIAN CYST: ICD-10-CM

## 2023-12-21 DIAGNOSIS — R11.2 NAUSEA & VOMITING: Primary | ICD-10-CM

## 2023-12-21 LAB
ANION GAP SERPL CALCULATED.3IONS-SCNC: 10 MMOL/L
BACTERIA UR QL AUTO: NORMAL /HPF
BASOPHILS # BLD AUTO: 0.04 THOUSANDS/ÂΜL (ref 0–0.1)
BASOPHILS NFR BLD AUTO: 0 % (ref 0–1)
BILIRUB UR QL STRIP: NEGATIVE
BUN SERPL-MCNC: 8 MG/DL (ref 5–25)
CALCIUM SERPL-MCNC: 9.3 MG/DL (ref 8.4–10.2)
CHLORIDE SERPL-SCNC: 101 MMOL/L (ref 96–108)
CLARITY UR: CLEAR
CO2 SERPL-SCNC: 24 MMOL/L (ref 21–32)
COLOR UR: YELLOW
CREAT SERPL-MCNC: 0.78 MG/DL (ref 0.6–1.3)
EOSINOPHIL # BLD AUTO: 0.05 THOUSAND/ÂΜL (ref 0–0.61)
EOSINOPHIL NFR BLD AUTO: 1 % (ref 0–6)
ERYTHROCYTE [DISTWIDTH] IN BLOOD BY AUTOMATED COUNT: 13.1 % (ref 11.6–15.1)
EXT PREGNANCY TEST URINE: NEGATIVE
EXT. CONTROL: NORMAL
GFR SERPL CREATININE-BSD FRML MDRD: 108 ML/MIN/1.73SQ M
GLUCOSE SERPL-MCNC: 99 MG/DL (ref 65–140)
GLUCOSE UR STRIP-MCNC: NEGATIVE MG/DL
HCT VFR BLD AUTO: 41.2 % (ref 34.8–46.1)
HGB BLD-MCNC: 13.6 G/DL (ref 11.5–15.4)
HGB UR QL STRIP.AUTO: ABNORMAL
IMM GRANULOCYTES # BLD AUTO: 0.03 THOUSAND/UL (ref 0–0.2)
IMM GRANULOCYTES NFR BLD AUTO: 0 % (ref 0–2)
KETONES UR STRIP-MCNC: ABNORMAL MG/DL
LEUKOCYTE ESTERASE UR QL STRIP: NEGATIVE
LYMPHOCYTES # BLD AUTO: 1.47 THOUSANDS/ÂΜL (ref 0.6–4.47)
LYMPHOCYTES NFR BLD AUTO: 14 % (ref 14–44)
MCH RBC QN AUTO: 27.5 PG (ref 26.8–34.3)
MCHC RBC AUTO-ENTMCNC: 33 G/DL (ref 31.4–37.4)
MCV RBC AUTO: 83 FL (ref 82–98)
MONOCYTES # BLD AUTO: 0.89 THOUSAND/ÂΜL (ref 0.17–1.22)
MONOCYTES NFR BLD AUTO: 8 % (ref 4–12)
NEUTROPHILS # BLD AUTO: 8.41 THOUSANDS/ÂΜL (ref 1.85–7.62)
NEUTS SEG NFR BLD AUTO: 77 % (ref 43–75)
NITRITE UR QL STRIP: NEGATIVE
NON-SQ EPI CELLS URNS QL MICRO: NORMAL /HPF
NRBC BLD AUTO-RTO: 0 /100 WBCS
PH UR STRIP.AUTO: 6.5 [PH]
PLATELET # BLD AUTO: 347 THOUSANDS/UL (ref 149–390)
PMV BLD AUTO: 10.4 FL (ref 8.9–12.7)
POTASSIUM SERPL-SCNC: 3.8 MMOL/L (ref 3.5–5.3)
PROT UR STRIP-MCNC: ABNORMAL MG/DL
RBC # BLD AUTO: 4.94 MILLION/UL (ref 3.81–5.12)
RBC #/AREA URNS AUTO: NORMAL /HPF
SODIUM SERPL-SCNC: 135 MMOL/L (ref 135–147)
SP GR UR STRIP.AUTO: 1.01
UROBILINOGEN UR QL STRIP.AUTO: 0.2 E.U./DL
WBC # BLD AUTO: 10.89 THOUSAND/UL (ref 4.31–10.16)
WBC #/AREA URNS AUTO: NORMAL /HPF

## 2023-12-21 PROCEDURE — 81001 URINALYSIS AUTO W/SCOPE: CPT | Performed by: FAMILY MEDICINE

## 2023-12-21 PROCEDURE — C9113 INJ PANTOPRAZOLE SODIUM, VIA: HCPCS | Performed by: FAMILY MEDICINE

## 2023-12-21 PROCEDURE — 36415 COLL VENOUS BLD VENIPUNCTURE: CPT | Performed by: FAMILY MEDICINE

## 2023-12-21 PROCEDURE — 81025 URINE PREGNANCY TEST: CPT | Performed by: FAMILY MEDICINE

## 2023-12-21 PROCEDURE — 74177 CT ABD & PELVIS W/CONTRAST: CPT

## 2023-12-21 PROCEDURE — G1004 CDSM NDSC: HCPCS

## 2023-12-21 PROCEDURE — 85025 COMPLETE CBC W/AUTO DIFF WBC: CPT | Performed by: FAMILY MEDICINE

## 2023-12-21 PROCEDURE — 99285 EMERGENCY DEPT VISIT HI MDM: CPT | Performed by: FAMILY MEDICINE

## 2023-12-21 PROCEDURE — 80048 BASIC METABOLIC PNL TOTAL CA: CPT | Performed by: FAMILY MEDICINE

## 2023-12-21 RX ORDER — METOCLOPRAMIDE HYDROCHLORIDE 5 MG/ML
5 INJECTION INTRAMUSCULAR; INTRAVENOUS ONCE
Status: COMPLETED | OUTPATIENT
Start: 2023-12-21 | End: 2023-12-21

## 2023-12-21 RX ORDER — METOCLOPRAMIDE 10 MG/1
5 TABLET ORAL EVERY 6 HOURS
Qty: 6 TABLET | Refills: 0 | Status: SHIPPED | OUTPATIENT
Start: 2023-12-21 | End: 2023-12-24

## 2023-12-21 RX ORDER — ONDANSETRON 2 MG/ML
4 INJECTION INTRAMUSCULAR; INTRAVENOUS ONCE
Status: COMPLETED | OUTPATIENT
Start: 2023-12-21 | End: 2023-12-21

## 2023-12-21 RX ORDER — PANTOPRAZOLE SODIUM 40 MG/10ML
40 INJECTION, POWDER, LYOPHILIZED, FOR SOLUTION INTRAVENOUS ONCE
Status: COMPLETED | OUTPATIENT
Start: 2023-12-21 | End: 2023-12-21

## 2023-12-21 RX ORDER — MIRTAZAPINE 7.5 MG/1
7.5 TABLET, FILM COATED ORAL DAILY PRN
COMMUNITY
Start: 2023-11-06

## 2023-12-21 RX ADMIN — METOCLOPRAMIDE 5 MG: 5 INJECTION, SOLUTION INTRAMUSCULAR; INTRAVENOUS at 17:59

## 2023-12-21 RX ADMIN — ONDANSETRON 4 MG: 2 INJECTION INTRAMUSCULAR; INTRAVENOUS at 15:39

## 2023-12-21 RX ADMIN — SODIUM CHLORIDE 1000 ML: 0.9 INJECTION, SOLUTION INTRAVENOUS at 15:39

## 2023-12-21 RX ADMIN — IOHEXOL 100 ML: 350 INJECTION, SOLUTION INTRAVENOUS at 18:23

## 2023-12-21 RX ADMIN — PANTOPRAZOLE SODIUM 40 MG: 40 INJECTION, POWDER, FOR SOLUTION INTRAVENOUS at 17:59

## 2023-12-21 NOTE — TELEPHONE ENCOUNTER
"Patient also complains of lightheaded/dizzy and feels like  \"my head isn't attached\"      Recommend that patient should be seen in the ED for dizziness and lightheadedness.      Reason for Disposition   [1] Drinking very little AND [2] dehydration suspected (e.g., no urine > 12 hours, very dry mouth, very lightheaded)    Additional Information   Other symptom is present, see that guideline.  (e.g., chest pain, headache, dizziness, abdominal pain, colds, sore throat, etc.).    Answer Assessment - Initial Assessment Questions  1. NAUSEA SEVERITY: \"How bad is the nausea?\" (e.g., mild, moderate, \"I have been throwing up everything for the last 4 days\" \" I am nausous and diziness\"  with \" abd pain on right side\"   2. ONSET: \"When did the nausea begin?\"  4 days ago   3. VOMITING: \"Any vomiting?\" If Yes, ask: \"How many times today?\"    Last 24 hours 6 or 7 times   4. RECURRENT SYMPTOM: \"Have you had nausea before?\" If Yes, ask: \"When was the last time?\" \"What happened that time?\"     Yes started several months ago.   5. CAUSE: \"What do you think is causing the nausea?\"    Unknown   6. PREGNANCY: \"Is there any chance you are pregnant?\" (e.g., unprotected intercourse, missed birth control pill, broken condom)   Denies    Protocols used: Nausea-ADULT-[place], Dizziness - Lightheadedness-ADULT-[place]    " [employer] [id number] [facility] was put on [facility] desk to process

## 2023-12-21 NOTE — ED PROVIDER NOTES
History  Chief Complaint   Patient presents with    Vomiting     Vomiting for 3 days regardless of food intake. Cannot tolerate po intake.       Vomiting  Associated symptoms: no abdominal pain, no chills, no cough, no diarrhea, no fever, no headaches, no myalgias and no sore throat    This is a 23-year-old female presented to ED with complaint of nausea vomiting ongoing for few days.  Patient states that she has been having good these symptoms on and off Rem[place] had a colonoscopy EGD as well as ultrasound done.  The patient states that shows a hiatal hernia.  She says she has an appointment with GI but not until  patient states that she started with the symptoms again which prompted this ED visit.  States that unable to tolerate p.o. intake at home also complaining of a abdominal pain which is chronic in nature.  Patient is otherwise stable awake alert oriented x 3 GCS 15.    Prior to Admission Medications   Prescriptions Last Dose Informant Patient Reported? Taking?   FLUoxetine (PROzac) 20 mg capsule   Yes No   Si mg   Mel 0.25-35 MG-MCG per tablet   Yes No   Sig: Take 1 tablet by mouth daily   famotidine (PEPCID) 40 MG tablet   No No   Sig: TAKE 1 TABLET BY MOUTH DAILY AT BEDTIME   hydrOXYzine HCL (ATARAX) 25 mg tablet   Yes No   Sig: TAKE 1 TABLET (25 MG TOTAL) BY MOUTH 2 (TWO) TIMES A DAY AS NEEDED FOR ANXIETY (INSOMNIA).   mirtazapine (REMERON) 7.5 MG tablet   Yes Yes   Sig: Take 7.5 mg by mouth daily as needed   omeprazole (PriLOSEC) 40 MG capsule   No No   Sig: TAKE 1 CAPSULE (40 MG TOTAL) BY MOUTH DAILY.   ondansetron (ZOFRAN) 4 mg tablet   No No   Sig: Take 1 tablet (4 mg total) by mouth every 8 (eight) hours as needed for nausea or vomiting   propranolol (INDERAL) 10 mg tablet   Yes No   Sig: TAKE 1 TABLET BY MOUTH 2 TIMES A DAY AS NEEDED (ANXIETY).      Facility-Administered Medications: None       Past Medical History:   Diagnosis Date    Anxiety     Depression     GERD (gastroesophageal  reflux disease)        History reviewed. No pertinent surgical history.    History reviewed. No pertinent family history.  I have reviewed and agree with the history as documented.    E-Cigarette/Vaping    E-Cigarette Use Never User      E-Cigarette/Vaping Substances    Nicotine No     THC No     CBD No     Flavoring No     Other No     Unknown No      Social History     Tobacco Use    Smoking status: Never    Smokeless tobacco: Never   Vaping Use    Vaping status: Never Used   Substance Use Topics    Alcohol use: Yes     Alcohol/week: 1.0 standard drink of alcohol     Types: 1 Cans of beer per week    Drug use: Never       Review of Systems   Constitutional:  Negative for chills and fever.   HENT:  Negative for rhinorrhea and sore throat.    Eyes:  Negative for visual disturbance.   Respiratory:  Negative for cough and shortness of breath.    Cardiovascular:  Negative for chest pain and leg swelling.   Gastrointestinal:  Positive for nausea and vomiting. Negative for abdominal pain and diarrhea.   Genitourinary:  Negative for dysuria.   Musculoskeletal:  Negative for back pain and myalgias.   Skin:  Negative for rash.   Neurological:  Negative for dizziness and headaches.   Psychiatric/Behavioral:  Negative for confusion.    All other systems reviewed and are negative.      Physical Exam  Physical Exam  Vitals and nursing note reviewed.   Constitutional:       Appearance: She is well-developed.   HENT:      Head: Normocephalic and atraumatic.      Right Ear: External ear normal.      Left Ear: External ear normal.      Nose: Nose normal.      Mouth/Throat:      Mouth: Mucous membranes are moist.      Pharynx: No oropharyngeal exudate.   Eyes:      General: No scleral icterus.        Right eye: No discharge.         Left eye: No discharge.      Conjunctiva/sclera: Conjunctivae normal.      Pupils: Pupils are equal, round, and reactive to light.   Cardiovascular:      Rate and Rhythm: Normal rate and regular rhythm.       Pulses: Normal pulses.      Heart sounds: Normal heart sounds.   Pulmonary:      Effort: Pulmonary effort is normal. No respiratory distress.      Breath sounds: Normal breath sounds. No wheezing.   Abdominal:      General: Bowel sounds are normal.      Palpations: Abdomen is soft.   Musculoskeletal:         General: Normal range of motion.      Cervical back: Normal range of motion and neck supple.   Lymphadenopathy:      Cervical: No cervical adenopathy.   Skin:     General: Skin is warm and dry.      Capillary Refill: Capillary refill takes less than 2 seconds.   Neurological:      General: No focal deficit present.      Mental Status: She is alert and oriented to person, place, and time.   Psychiatric:         Mood and Affect: Mood normal.         Behavior: Behavior normal.         Vital Signs  ED Triage Vitals   Temperature Pulse Respirations Blood Pressure SpO2   12/21/23 1527 12/21/23 1527 12/21/23 1527 12/21/23 1527 12/21/23 1527   (!) 97.4 °F (36.3 °C) 68 16 141/65 96 %      Temp Source Heart Rate Source Patient Position - Orthostatic VS BP Location FiO2 (%)   12/21/23 1527 12/21/23 1527 12/21/23 1527 12/21/23 1527 --   Tympanic Monitor Sitting Left arm       Pain Score       12/21/23 1525       No Pain           Vitals:    12/21/23 1527   BP: 141/65   Pulse: 68   Patient Position - Orthostatic VS: Sitting         Visual Acuity      ED Medications  Medications   sodium chloride 0.9 % bolus 1,000 mL (0 mL Intravenous Stopped 12/21/23 1720)   ondansetron (ZOFRAN) injection 4 mg (4 mg Intravenous Given 12/21/23 1539)   metoclopramide (REGLAN) injection 5 mg (5 mg Intravenous Given 12/21/23 1759)   pantoprazole (PROTONIX) injection 40 mg (40 mg Intravenous Given 12/21/23 1759)   iohexol (OMNIPAQUE) 350 MG/ML injection (MULTI-DOSE) 100 mL (100 mL Intravenous Given 12/21/23 1823)       Diagnostic Studies  Results Reviewed       Procedure Component Value Units Date/Time    POCT pregnancy, urine [[id number]]   (Normal) Resulted: 12/21/23 1752    Lab Status: Final result Updated: 12/21/23 1752     EXT Preg Test, Ur Negative     Control Valid    Urine Microscopic [[id number]]  (Normal) Collected: 12/21/23 1639    Lab Status: Final result Specimen: Urine, Clean Catch Updated: 12/21/23 1656     RBC, UA 0-1 /hpf      WBC, UA 0-1 /hpf      Epithelial Cells None Seen /hpf      Bacteria, UA None Seen /hpf     UA w Reflex to Microscopic w Reflex to Culture [[id number]]  (Abnormal) Collected: 12/21/23 1639    Lab Status: Final result Specimen: Urine, Clean Catch Updated: 12/21/23 1648     Color, UA Yellow     Clarity, UA Clear     Specific Gravity, UA 1.010     pH, UA 6.5     Leukocytes, UA Negative     Nitrite, UA Negative     Protein, UA Trace mg/dl      Glucose, UA Negative mg/dl      Ketones, UA 15 (1+) mg/dl      Urobilinogen, UA 0.2 E.U./dl      Bilirubin, UA Negative     Occult Blood, UA Trace-lysed    Basic metabolic panel [[id number]] Collected: 12/21/23 1539    Lab Status: Final result Specimen: Blood from Arm, Right Updated: 12/21/23 1603     Sodium 135 mmol/L      Potassium 3.8 mmol/L      Chloride 101 mmol/L      CO2 24 mmol/L      ANION GAP 10 mmol/L      BUN 8 mg/dL      Creatinine 0.78 mg/dL      Glucose 99 mg/dL      Calcium 9.3 mg/dL      eGFR 108 ml/min/1.73sq m     Narrative:      National Kid[employer] Foundation guidelines for Chronic Kidney Disease (CKD):     Stage 1 with normal or high GFR (GFR > 90 mL/min/1.73 square meters)    Stage 2 Mild CKD (GFR = 60-89 mL/min/1.73 square meters)    Stage 3A Moderate CKD (GFR = 45-59 mL/min/1.73 square meters)    Stage 3B Moderate CKD (GFR = 30-44 mL/min/1.73 square meters)    Stage 4 Severe CKD (GFR = 15-29 mL/min/1.73 square meters)    Stage 5 End Stage CKD (GFR <15 mL/min/1.73 square meters)  Note: GFR calculation is accurate only with a steady state creatinine    CBC and differential [[id number]]  (Abnormal) Collected: 12/21/23 1539    Lab Status: Final result  Specimen: Blood from Arm, Right Updated: 12/21/23 1545     WBC 10.89 Thousand/uL      RBC 4.94 Million/uL      Hemoglobin 13.6 g/dL      Hematocrit 41.2 %      MCV 83 fL      MCH 27.5 pg      MCHC 33.0 g/dL      RDW 13.1 %      MPV 10.4 fL      Platelets 347 Thousands/uL      nRBC 0 /100 WBCs      Neutrophils Relative 77 %      Immat GRANS % 0 %      Lymphocytes Relative 14 %      Monocytes Relative 8 %      Eosinophils Relative 1 %      Basophils Relative 0 %      Neutrophils Absolute 8.41 Thousands/µL      Immature Grans Absolute 0.03 Thousand/uL      Lymphocytes Absolute 1.47 Thousands/µL      Monocytes Absolute 0.89 Thousand/µL      Eosinophils Absolute 0.05 Thousand/µL      Basophils Absolute 0.04 Thousands/µL                    CT abdomen pelvis with contrast   Final Result by Paz Santana MD (12/21 1923)   No acute intra-abdominal pathology.   3.8 x 2.1 cm left ovarian cyst, possibly physiologic. No suspicious radiographic features.               Workstation performed: [id number]                    Procedures  Procedures         ED Course  ED Course as of 12/21/23 1942   Thu Dec 21, 2023   1756 Patient was given ginger ale took a sip and started to vomit.  Not tolerating p.o. intake in the ED.                               SBIRT 20yo+      Flowsheet Row Most Recent Value   Initial Alcohol Screen: US AUDIT-C     1. How often do you have a drink containing alcohol? 0 Filed at: 12/21/2023 1526   2. How many drinks containing alcohol do you have on a typical day you are drinking?  0 Filed at: 12/21/2023 1526   3b. FEMALE Any Age, or MALE 65+: How often do you have 4 or more drinks on one occassion? 0 Filed at: 12/21/2023 1526   Audit-C Score 0 Filed at: 12/21/2023 1526   [place]: How many times in the past year have you...    Used an illegal drug or used a prescription medication for non-medical reasons? Never Filed at: 12/21/2023 1526                      Medical Decision Making  9-year-old female patient with  history as above presented with abdominal pain nausea vomiting.  Patient says she been having these intermittent symptoms had a EGD colonoscopy done.  History obtained from patient.    Patient was nontoxic, stable. Ambulatory. Exam as above.     Labs reviewed. Labs WNL    Reviewed external records.     Differential diagnosis considered. Overall presentation is consistent with low risk abdominal pain. Low suspicion for cholecystitis, appendicitis, SBO, AAA rupture, or other serious pathology.     Patient was treated with Zofran states that was feeling better patient is given ginger ale to drink patient was not able to tolerate a started to vomit after 2 steps.  Will obtain CT at this point.  CT within the limits that shows left ovarian cyst.  Discussed with patient patient states she is aware and will follow-up with the OB/GYN.  Patient is feeling much better after Reglan will give her couple pills recommending following up with the GI and OB/GYN.    Consideration was given for admission, but the patient was stable for outpatient management.     Disposition: Discussed need to follow up diagnostics, including incidental findings. Discharged with instructions to obtain outpatient follow up of patient's symptoms and findings, with strict return precautions if patient develops new or worsening symptoms.          Amount and/or Complexity of Data Reviewed  Labs: ordered.  Radiology: ordered.    Risk  Prescription drug management.             Disposition  Final diagnoses:   Nausea & vomiting   Ovarian cyst     Time reflects when diagnosis was documented in both MDM as applicable and the Disposition within this note       Time User Action Codes Description Comment    12/21/2023  7:29 PM Moe Alberts Add [R11.2] Nausea & vomiting     12/21/2023  7:30 PM Moe Alberts Add [N83.209] Ovarian cyst           ED Disposition       ED Disposition   Discharge    Condition   Stable    Date/Time   Thu Dec 21, 2023 1630    Comment    Jessica Keyes discharge to home/self care.                   Follow-up Information       Follow up With Specialties Details Why Contact Info Additional Information    SUSSY Peterson Nurse Practitioner Schedule an appointment as soon as possible for a visit in 2 days If symptoms worsen [address]  [address]  [facility] [address] [phone]       [facility] Obstetrics and Gynecology Schedule an appointment as soon as possible for a visit in 2 days If symptoms worsen [address] [place] [id number] [phone] [facility], [address] [place] [address], [address] [phone]            Patient's Medications   Discharge Prescriptions    METOCLOPRAMIDE (REGLAN) 10 MG TABLET    Take 0.5 tablets (5 mg total) by mouth every 6 (six) hours for 3 days       Start Date: 12/21/2023End Date: 12/24/2023       Order Dose: 5 mg       Quantity: 6 tablet    Refills: 0       No discharge procedures on file.    PDMP Review       None            ED Provider  Electronically Signed by             Moe Alberts MD  12/21/23 1942

## 2023-12-21 NOTE — Clinical Note
Jessica Keyes was seen and treated in our emergency department on 12/21/2023.                Diagnosis:     Jessica  may return to work on return date.    She may return on this date: 12/24/2023         If you have any questions or concerns, please don't hesitate to call.      Moe Alberts MD    ______________________________           _______________          _______________  Hospital Representative                              Date                                Time

## 2024-01-02 ENCOUNTER — OFFICE VISIT (OUTPATIENT)
Dept: GASTROENTEROLOGY | Facility: MEDICAL CENTER | Age: 24
End: 2024-01-02
Payer: COMMERCIAL

## 2024-01-02 VITALS
HEART RATE: 70 BPM | HEIGHT: 64 IN | TEMPERATURE: 98.4 F | OXYGEN SATURATION: 97 % | BODY MASS INDEX: 39.23 KG/M2 | DIASTOLIC BLOOD PRESSURE: 80 MMHG | WEIGHT: 229.8 LBS | SYSTOLIC BLOOD PRESSURE: 132 MMHG

## 2024-01-02 DIAGNOSIS — R19.7 DIARRHEA, UNSPECIFIED TYPE: Primary | ICD-10-CM

## 2024-01-02 DIAGNOSIS — K92.0 HEMATEMESIS WITH NAUSEA: ICD-10-CM

## 2024-01-02 DIAGNOSIS — K29.70 GASTRITIS: ICD-10-CM

## 2024-01-02 DIAGNOSIS — R19.7 DIARRHEA, UNSPECIFIED TYPE: ICD-10-CM

## 2024-01-02 DIAGNOSIS — R11.14 BILIOUS VOMITING WITH NAUSEA: ICD-10-CM

## 2024-01-02 PROCEDURE — 99214 OFFICE O/P EST MOD 30 MIN: CPT | Performed by: PHYSICIAN ASSISTANT

## 2024-01-02 RX ORDER — ONDANSETRON 4 MG/1
4 TABLET, FILM COATED ORAL EVERY 8 HOURS PRN
Qty: 30 TABLET | Refills: 2 | Status: SHIPPED | OUTPATIENT
Start: 2024-01-02

## 2024-01-02 RX ORDER — LOPERAMIDE HYDROCHLORIDE 2 MG/1
2 TABLET ORAL 4 TIMES DAILY PRN
Qty: 30 TABLET | Refills: 2 | Status: SHIPPED | OUTPATIENT
Start: 2024-01-02

## 2024-01-02 NOTE — PROGRESS NOTES
[facility] - Outpatient Follow-up Note  Jessica Keyes 23 y.o. female MRN: [id number]  Encounter: [id number]      Assessment and Plan    1. Nausea, vomiting, diarrhea  2. RUQ pain   The patient has been having bouts of nausea, vomiting with hematemesis, and diarrhea with rectal bleeding associated with right upper quadrant pain and belching.  Thus far she has had a thorough workup including blood work (CBC, CMP, lipase), stool testing (stool bacterial panel, ova/parasites, c diff, giardia, fecal anjali), imaging (CT, U/S), EGD and colonoscopy. Blood work, stool testing, and U/S normal. CT with an incidental ovarian cyst. EGD with a 1cm hiatal hernia and mild erythematous mucosa in the antrum, stomach and duodenal biopsies normal.  Colonoscopy with nodular mucosa in the terminal ileum and mild patchy erythematous mucosa in the sigmoid colon, TI and random colon biopsies without concern. No etiology of the patients symptoms have been found.   -Obtain TSH  -Possibly IBS-D, 2 week course of xifaxan   -Does not sound biliary in etiology to obtain HIDA scan or like gastroparesis to obtain GES, if xiafaxan ineffective can consider pancreatic fecal elastase as the patient does have some fatty/oily appearing stools with some bloating  -If severe symptoms persist (hematechezia, rectal bleeding, nighttime symptoms) can consider CTE however her fecal anjali and scopes were negative for IBD so may be low yield     3. Belching  Patient has complaints of belching which was witnessed during visit. Recent EGD without etiology. Appears to be aerophagia  -Reviewed aerophagia, recommend measures to reduce this including no drinking from straws, chewing gum, eating quickly, etc    Follow up 2-3 months     ______________________________________________________________________    History of Present Illness  Jessica Keyes is a 23 y.o. female here for follow up evaluation of nausea, vomiting with hematemesis,  diarrhea with rectal bleeding, RUQ pain, and belching. Blood work and stool tests including CBC, CMP, lipase, UA, pregnancy test, stool bacterial panel, ova/parasites, c diff, giardia, fecal anjali all normal. Imaging including complete abdominal US and CT a/p w/ IV contrast normal aside for a 3.8cm left ovarian cyst. EGD with a 1cm hiatal hernia and mild erythematous mucosa in the antrum.  Stomach and duodenal biopsies normal.  Colonoscopy with nodular mucosa in the terminal ileum and mild patchy erythematous mucosa in the sigmoid colon.  Terminal ileum and random colon biopsies without concern.    The patient continues to have bouts of nausea, vomiting, and diarrhea.  She states that her symptoms are typically postprandial.  If she does not get postprandial diarrhea she will get a bout of nausea and vomiting.  This will typically last a few days and during the episode she is unable to tolerate oral intake.  She has been seen in ER multiple different occasions for this.  Most recently she was seen 12/21/2023 and after being given Reglan her symptoms improved and she was discharged home.      Review of Systems   Constitutional:  Negative for activity change, appetite change, chills, fatigue, fever and unexpected weight change.   Musculoskeletal:  Negative for back pain and gait problem.   Psychiatric/Behavioral:  Negative for confusion.        Past Medical History  Past Medical History:   Diagnosis Date    Anxiety     Depression     GERD (gastroesophageal reflux disease)        Past Social history  History reviewed. No pertinent surgical history.  Social History     Socioeconomic History    Marital status: Single     Spouse name: Not on file    Number of children: Not on file    Years of education: Not on file    Highest education level: Not on file   Occupational History    Not on file   Tobacco Use    Smoking status: Never    Smokeless tobacco: Never   Vaping Use    Vaping status: Never Used   Substance and Sexual  Activity    Alcohol use: Yes     Alcohol/week: 1.0 standard drink of alcohol     Types: 1 Cans of beer per week    Drug use: Never    Sexual activity: Yes     Partners: Male     Birth control/protection: Other     Comment: on pills   Other Topics Concern    Not on file   Social History Narrative    Not on file     Social Determinants of Health     Financial Resource Strain: Not on file   Food Insecurity: Not on file   Transportation Needs: Not on file   Physical Activity: Not on file   Stress: Not on file   Social Connections: Not on file   Intimate Partner Violence: Not on file   Housing Stability: Not on file     Social History     Substance and Sexual Activity   Alcohol Use Yes    Alcohol/week: 1.0 standard drink of alcohol    Types: 1 Cans of beer per week     Social History     Substance and Sexual Activity   Drug Use Never     Social History     Tobacco Use   Smoking Status Never   Smokeless Tobacco Never       Past Family History  History reviewed. No pertinent family history.    Current Medications  Current Outpatient Medications   Medication Sig Dispense Refill    famotidine (PEPCID) 40 MG tablet TAKE 1 TABLET BY MOUTH DAILY AT BEDTIME 90 tablet 1    FLUoxetine (PROzac) 20 mg capsule 40 mg      hydrOXYzine HCL (ATARAX) 25 mg tablet TAKE 1 TABLET (25 MG TOTAL) BY MOUTH 2 (TWO) TIMES A DAY AS NEEDED FOR ANXIETY (INSOMNIA).      loperamide (IMODIUM A-D) 2 MG tablet Take 1 tablet (2 mg total) by mouth 4 (four) times a day as needed for diarrhea 30 tablet 2    Mel 0.25-35 MG-MCG per tablet Take 1 tablet by mouth daily      mirtazapine (REMERON) 7.5 MG tablet Take 7.5 mg by mouth daily as needed      omeprazole (PriLOSEC) 40 MG capsule TAKE 1 CAPSULE (40 MG TOTAL) BY MOUTH DAILY. 90 capsule 1    ondansetron (ZOFRAN) 4 mg tablet Take 1 tablet (4 mg total) by mouth every 8 (eight) hours as needed for nausea or vomiting 30 tablet 2    propranolol (INDERAL) 10 mg tablet TAKE 1 TABLET BY MOUTH 2 TIMES A DAY AS NEEDED  "(ANXIETY).      rifaximin (XIFAXAN) 550 mg tablet Take 1 tablet (550 mg total) by mouth every 12 (twelve) hours for 14 days 28 tablet 0     No current facility-administered medications for this visit.       Allergies  Allergies   Allergen Reactions    Cefdinir Rash         The following portions of the patient's history were reviewed and updated as appropriate: allergies, current medications, past medical history, past social history, past surgical history and problem list.      Vitals  Vitals:    01/02/24 1032   BP: 132/80   BP Location: Left arm   Pulse: 70   Temp: 98.4 °F (36.9 °C)   SpO2: 97%   Weight: 104 kg (229 lb 12.8 oz)   Height: 5' 4\" (1.626 m)         Physical Exam  Constitutional   General appearance: Patient is seated and in no acute distress, well appearing and well nourished.   Head and Face   Head and face: Normal.    Eyes   Conjunctiva and lids: No erythema, swelling or discharge.  Anicteric.  Ears, Nose, Mouth, and Throat   Hearing: Normal.    Neck: Supple, trachea midline.  Pulmonary   Respiratory effort: No increased work of breathing or signs of respiratory distress.    Cardiovascular   Examination of extremities for edema and/or varicosities: Normal.    Musculoskeletal   Gait and station: Normal   Skin   Skin and subcutaneous tissue: Warm, dry, and intact. No visible jaundice, lesions or rashes.  Psychiatric   Judgment and insight: Normal  Recent and remote memory:  Normal  Mood and affect: Normal      Results  No visits with results within 1 Day(s) from this visit.   Latest known visit with results is:   Admission on 12/21/2023, Discharged on 12/21/2023   Component Date Value    WBC 12/21/2023 10.89 (H)     RBC 12/21/2023 4.94     Hemoglobin 12/21/2023 13.6     Hematocrit 12/21/2023 41.2     MCV 12/21/2023 83     MCH 12/21/2023 27.5     MCHC 12/21/2023 33.0     RDW 12/21/2023 13.1     MPV 12/21/2023 10.4     Platelets 12/21/2023 347     nRBC 12/21/2023 0     Neutrophils Relative 12/21/2023 77 " (H)     Immat GRANS % 12/21/2023 0     Lymphocytes Relative 12/21/2023 14     Monocytes Relative 12/21/2023 8     Eosinophils Relative 12/21/2023 1     Basophils Relative 12/21/2023 0     Neutrophils Absolute 12/21/2023 8.41 (H)     Immature Grans Absolute 12/21/2023 0.03     Lymphocytes Absolute 12/21/2023 1.47     Monocytes Absolute 12/21/2023 0.89     Eosinophils Absolute 12/21/2023 0.05     Basophils Absolute 12/21/2023 0.04     Sodium 12/21/2023 135     Potassium 12/21/2023 3.8     Chloride 12/21/2023 101     CO2 12/21/2023 24     ANION GAP 12/21/2023 10     BUN 12/21/2023 8     Creatinine 12/21/2023 0.78     Glucose 12/21/2023 99     Calcium 12/21/2023 9.3     eGFR 12/21/2023 108     Color, UA 12/21/2023 Yellow     Clarity, UA 12/21/2023 Clear     Specific Gravity, UA 12/21/2023 1.010     pH, UA 12/21/2023 6.5     Leukocytes, UA 12/21/2023 Negative     Nitrite, UA 12/21/2023 Negative     Protein, UA 12/21/2023 Trace (A)     Glucose, UA 12/21/2023 Negative     Ketones, UA 12/21/2023 15 (1+) (A)     Urobilinogen, UA 12/21/2023 0.2     Bilirubin, UA 12/21/2023 Negative     Occult Blood, UA 12/21/2023 Trace-lysed (A)     EXT Preg Test, Ur 12/21/2023 Negative     Control 12/21/2023 Valid     RBC, UA 12/21/2023 0-1     WBC, UA 12/21/2023 0-1     Epithelial Cells 12/21/2023 None Seen     Bacteria, UA 12/21/2023 None Seen        Radiology Results  CT abdomen pelvis with contrast    Result Date: 12/21/2023  Narrative: CT ABDOMEN AND PELVIS WITH IV CONTRAST INDICATION:   Abdominal pain, acute, nonlocalized abdominal pain. COMPARISON:  None. TECHNIQUE:  CT examination of the abdomen and pelvis was performed. Multiplanar 2D reformatted images were created from the source data. This examination, like all CT scans performed in the [facility], was performed utilizing techniques to minimize radiation dose exposure, including the use of iterative reconstruction and automated exposure control. Radiation dose  length product (DLP) for this visit:  855 mGy-cm IV Contrast:  100 mL of iohexol (OMNIPAQUE) Enteric Contrast:  Enteric contrast was not administered. FINDINGS: ABDOMEN LOWER CHEST:  No clinically significant abnormality identified in the visualized lower chest. LIVER/BILIARY TREE:  Unremarkable. GALLBLADDER:  No calcified gallstones. No pericholecystic inflammatory change. SPLEEN:  Unremarkable. PANCREAS:  Unremarkable. ADRENAL GLANDS:  Unremarkable. KIDNEYS/URETERS:  Unremarkable. No hydronephrosis. STOMACH AND BOWEL: No evidence of bowel obstruction or gross inflammatory process. APPENDIX: A normal appendix was visualized. ABDOMINOPELVIC CAVITY:  No ascites.  No pneumoperitoneum.  No lymphadenopathy. VESSELS:  Unremarkable for patient's age. PELVIS REPRODUCTIVE ORGANS: 3.8 x 2.1 cm left ovarian cyst, otherwise unremarkable. URINARY BLADDER:  Unremarkable. ABDOMINAL WALL/INGUINAL REGIONS:  Unremarkable. OSSEOUS STRUCTURES:  No acute fracture or destructive osseous lesion.     Impression: No acute intra-abdominal pathology. 3.8 x 2.1 cm left ovarian cyst, possibly physiologic. No suspicious radiographic features. Workstation performed: [id number]       Orders  Orders Placed This Encounter   Procedures    TSH, 3rd generation with Free T4 reflex     Standing Status:   Future     Standing Expiration Date:   1/2/2025       Answers submitted by the patient for this visit:  Abdominal Pain Questionnaire (Submitted on 1/2/2024)  Chief Complaint: Abdominal pain  Chronicity: recurrent  Onset: more than 1 month ago  Onset quality: sudden  Frequency: 2 to 4 times per day  Episode duration: 2 Hours  Progression since onset: gradually worsening  Pain location: LUQ, RUQ  Pain - numeric: 7/10  Pain quality: burning, cramping, sharp  Radiates to: left shoulder, right shoulder, back  anorexia: Yes  belching: Yes  flatus: Yes  hematochezia: Yes  melena: Yes  weight loss: Yes  Aggravated by: movement  Relieved by: belching,  palpation  Diagnostic workup: CT scan, GI consult, lower endoscopy, ultrasound, upper endoscopy

## 2024-01-04 RX ORDER — RIFAXIMIN 550 MG/1
TABLET ORAL
Qty: 28 TABLET | Refills: 0 | Status: SHIPPED | OUTPATIENT
Start: 2024-01-04 | End: 2024-01-18

## 2024-01-10 DIAGNOSIS — R19.7 DIARRHEA, UNSPECIFIED TYPE: ICD-10-CM

## 2024-01-25 DIAGNOSIS — K92.0 HEMATEMESIS WITH NAUSEA: ICD-10-CM

## 2024-01-25 DIAGNOSIS — R11.14 BILIOUS VOMITING WITH NAUSEA: ICD-10-CM

## 2024-01-25 DIAGNOSIS — K29.70 GASTRITIS: ICD-10-CM

## 2024-01-25 DIAGNOSIS — R19.7 DIARRHEA, UNSPECIFIED TYPE: ICD-10-CM

## 2024-01-26 RX ORDER — OMEPRAZOLE 40 MG/1
40 CAPSULE, DELAYED RELEASE ORAL DAILY
Qty: 90 CAPSULE | Refills: 1 | Status: SHIPPED | OUTPATIENT
Start: 2024-01-26

## 2024-01-26 RX ORDER — ONDANSETRON 4 MG/1
4 TABLET, FILM COATED ORAL EVERY 8 HOURS PRN
Qty: 30 TABLET | Refills: 0 | Status: SHIPPED | OUTPATIENT
Start: 2024-01-26

## 2024-01-26 RX ORDER — LOPERAMIDE HYDROCHLORIDE 2 MG/1
2 TABLET ORAL 4 TIMES DAILY PRN
Qty: 30 TABLET | Refills: 0 | Status: SHIPPED | OUTPATIENT
Start: 2024-01-26

## 2024-01-26 RX ORDER — FAMOTIDINE 40 MG/1
40 TABLET, FILM COATED ORAL
Qty: 90 TABLET | Refills: 1 | Status: SHIPPED | OUTPATIENT
Start: 2024-01-26

## 2024-04-15 ENCOUNTER — APPOINTMENT (EMERGENCY)
Dept: CT IMAGING | Facility: HOSPITAL | Age: 24
End: 2024-04-15

## 2024-04-15 ENCOUNTER — HOSPITAL ENCOUNTER (EMERGENCY)
Facility: HOSPITAL | Age: 24
Discharge: HOME | End: 2024-04-15
Attending: EMERGENCY MEDICINE

## 2024-04-15 VITALS
RESPIRATION RATE: 18 BRPM | HEART RATE: 71 BPM | SYSTOLIC BLOOD PRESSURE: 133 MMHG | OXYGEN SATURATION: 95 % | DIASTOLIC BLOOD PRESSURE: 97 MMHG | HEIGHT: 64 IN | WEIGHT: 220 LBS | TEMPERATURE: 98.4 F | BODY MASS INDEX: 37.56 KG/M2

## 2024-04-15 DIAGNOSIS — R10.9 ABDOMINAL PAIN: Primary | ICD-10-CM

## 2024-04-15 DIAGNOSIS — R11.2 NAUSEA AND VOMITING: ICD-10-CM

## 2024-04-15 LAB
ALBUMIN SERPL BCP-MCNC: 4 G/DL (ref 3.5–5)
ALP SERPL-CCNC: 64 U/L (ref 34–104)
ALT SERPL W P-5'-P-CCNC: 15 U/L (ref 7–52)
ANION GAP SERPL CALCULATED.3IONS-SCNC: 10 MMOL/L (ref 4–13)
AST SERPL W P-5'-P-CCNC: 16 U/L (ref 13–39)
BACTERIA UR QL AUTO: NORMAL /HPF
BASOPHILS # BLD AUTO: 0.06 THOUSANDS/ÂΜL (ref 0–0.1)
BASOPHILS NFR BLD AUTO: 1 % (ref 0–1)
BILIRUB SERPL-MCNC: 0.36 MG/DL (ref 0.2–1)
BILIRUB UR QL STRIP: NEGATIVE
BUN SERPL-MCNC: 9 MG/DL (ref 5–25)
CALCIUM SERPL-MCNC: 8.8 MG/DL (ref 8.4–10.2)
CHLORIDE SERPL-SCNC: 103 MMOL/L (ref 96–108)
CLARITY UR: CLEAR
CO2 SERPL-SCNC: 23 MMOL/L (ref 21–32)
COLOR UR: YELLOW
CREAT SERPL-MCNC: 0.84 MG/DL (ref 0.6–1.3)
EOSINOPHIL # BLD AUTO: 0.26 THOUSAND/ÂΜL (ref 0–0.61)
EOSINOPHIL NFR BLD AUTO: 2 % (ref 0–6)
ERYTHROCYTE [DISTWIDTH] IN BLOOD BY AUTOMATED COUNT: 13.5 % (ref 11.6–15.1)
EXT PREGNANCY TEST URINE: NEGATIVE
EXT. CONTROL: NORMAL
GFR SERPL CREATININE-BSD FRML MDRD: 98 ML/MIN/1.73SQ M
GLUCOSE SERPL-MCNC: 110 MG/DL (ref 65–140)
GLUCOSE UR STRIP-MCNC: NEGATIVE MG/DL
HCT VFR BLD AUTO: 40 % (ref 34.8–46.1)
HGB BLD-MCNC: 12.8 G/DL (ref 11.5–15.4)
HGB UR QL STRIP.AUTO: NEGATIVE
IMM GRANULOCYTES # BLD AUTO: 0.06 THOUSAND/UL (ref 0–0.2)
IMM GRANULOCYTES NFR BLD AUTO: 1 % (ref 0–2)
KETONES UR STRIP-MCNC: NEGATIVE MG/DL
LEUKOCYTE ESTERASE UR QL STRIP: NEGATIVE
LIPASE SERPL-CCNC: 17 U/L (ref 11–82)
LYMPHOCYTES # BLD AUTO: 3.43 THOUSANDS/ÂΜL (ref 0.6–4.47)
LYMPHOCYTES NFR BLD AUTO: 30 % (ref 14–44)
MCH RBC QN AUTO: 26.3 PG (ref 26.8–34.3)
MCHC RBC AUTO-ENTMCNC: 32 G/DL (ref 31.4–37.4)
MCV RBC AUTO: 82 FL (ref 82–98)
MONOCYTES # BLD AUTO: 0.8 THOUSAND/ÂΜL (ref 0.17–1.22)
MONOCYTES NFR BLD AUTO: 7 % (ref 4–12)
NEUTROPHILS # BLD AUTO: 6.86 THOUSANDS/ÂΜL (ref 1.85–7.62)
NEUTS SEG NFR BLD AUTO: 59 % (ref 43–75)
NITRITE UR QL STRIP: NEGATIVE
NON-SQ EPI CELLS URNS QL MICRO: NORMAL /HPF
NRBC BLD AUTO-RTO: 0 /100 WBCS
PH UR STRIP.AUTO: 6.5 [PH]
PLATELET # BLD AUTO: 384 THOUSANDS/UL (ref 149–390)
PMV BLD AUTO: 10.6 FL (ref 8.9–12.7)
POTASSIUM SERPL-SCNC: 3.8 MMOL/L (ref 3.5–5.3)
PROT SERPL-MCNC: 7 G/DL (ref 6.4–8.4)
PROT UR STRIP-MCNC: ABNORMAL MG/DL
RBC # BLD AUTO: 4.86 MILLION/UL (ref 3.81–5.12)
RBC #/AREA URNS AUTO: NORMAL /HPF
SODIUM SERPL-SCNC: 136 MMOL/L (ref 135–147)
SP GR UR STRIP.AUTO: 1.01
UROBILINOGEN UR QL STRIP.AUTO: 0.2 E.U./DL
WBC # BLD AUTO: 11.47 THOUSAND/UL (ref 4.31–10.16)
WBC #/AREA URNS AUTO: NORMAL /HPF

## 2024-04-15 PROCEDURE — 36415 COLL VENOUS BLD VENIPUNCTURE: CPT

## 2024-04-15 PROCEDURE — 74177 CT ABD & PELVIS W/CONTRAST: CPT

## 2024-04-15 PROCEDURE — 81001 URINALYSIS AUTO W/SCOPE: CPT

## 2024-04-15 PROCEDURE — 81025 URINE PREGNANCY TEST: CPT

## 2024-04-15 PROCEDURE — 99285 EMERGENCY DEPT VISIT HI MDM: CPT | Performed by: EMERGENCY MEDICINE

## 2024-04-15 PROCEDURE — 85025 COMPLETE CBC W/AUTO DIFF WBC: CPT

## 2024-04-15 PROCEDURE — 96375 TX/PRO/DX INJ NEW DRUG ADDON: CPT

## 2024-04-15 PROCEDURE — 99284 EMERGENCY DEPT VISIT MOD MDM: CPT

## 2024-04-15 PROCEDURE — 96374 THER/PROPH/DIAG INJ IV PUSH: CPT

## 2024-04-15 PROCEDURE — 80053 COMPREHEN METABOLIC PANEL: CPT

## 2024-04-15 PROCEDURE — 83690 ASSAY OF LIPASE: CPT

## 2024-04-15 RX ORDER — MAGNESIUM HYDROXIDE/ALUMINUM HYDROXICE/SIMETHICONE 120; 1200; 1200 MG/30ML; MG/30ML; MG/30ML
30 SUSPENSION ORAL ONCE
Status: COMPLETED | OUTPATIENT
Start: 2024-04-15 | End: 2024-04-15

## 2024-04-15 RX ORDER — ONDANSETRON 2 MG/ML
4 INJECTION INTRAMUSCULAR; INTRAVENOUS ONCE
Status: COMPLETED | OUTPATIENT
Start: 2024-04-15 | End: 2024-04-15

## 2024-04-15 RX ORDER — KETOROLAC TROMETHAMINE 30 MG/ML
15 INJECTION, SOLUTION INTRAMUSCULAR; INTRAVENOUS ONCE
Status: COMPLETED | OUTPATIENT
Start: 2024-04-15 | End: 2024-04-15

## 2024-04-15 RX ADMIN — ONDANSETRON 4 MG: 2 INJECTION INTRAMUSCULAR; INTRAVENOUS at 13:39

## 2024-04-15 RX ADMIN — IOHEXOL 100 ML: 350 INJECTION, SOLUTION INTRAVENOUS at 12:42

## 2024-04-15 RX ADMIN — KETOROLAC TROMETHAMINE 15 MG: 30 INJECTION, SOLUTION INTRAMUSCULAR; INTRAVENOUS at 11:55

## 2024-04-15 RX ADMIN — ALUMINUM HYDROXIDE, MAGNESIUM HYDROXIDE, AND DIMETHICONE 30 ML: 200; 20; 200 SUSPENSION ORAL at 11:55

## 2024-04-15 NOTE — DISCHARGE INSTRUCTIONS
Immediately return to the emergency room if you experience any new or worsening symptoms or if the symptoms are lasting longer than expected.     Please follow-up with gastroenterology. Please take Maalox as needed for upper abdominal pain. Please continue taking Zofran as needed for nausea.    CT abdomen/pelvis IMPRESSION:  1.  No acute abdominopelvic CT abnormality.  2.  3 cm left ovarian benign-appearing cyst

## 2024-04-15 NOTE — ED PROVIDER NOTES
History  Chief Complaint   Patient presents with    Abdominal Pain     Patient arrives with complaints of right sided abdominal pain and states vomited dark red blood this morning.      Patient is a 23-year-old female with relevant past medical history of anxiety, depression, GERD, gastritis, diarrhea, and left ovarian cyst presenting with abdominal pain x 1 day. She woke up this morning and was feeling good and went to the bathroom and vomited what looked like blood x 2. She then had the belly pain come on after this. She presents to the emergency department with right-sided abdominal pain and rates it a 5/10 sharp pain that radiates up her abdomen towards her gallbladder. She has an extensive history of this and has presented to our emergency department several times with a similar presentation and was diagnosed with gastritis. She had an EGD and colonoscopy performed a few months ago. She has a hernia and enlarged gallbladder. She reports the abdominal pain is different this time and her mom says she can't continue vomiting. Patient reports this is the worst abdominal pain that she has experienced. Her mom is adamant something is wrong. 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 She did not eat or drink this morning. She was dizzy earlier when she stood up and was also nauseous but this has since improved. She reports having 5-6 bowel movements per day and has not had a bowel movement yet today. Patient denies fever, chills, headache, lightheadedness, weakness, visual changes, chest pain, shortness of breath, constipation, diarrhea, bloody stools, dysuria, or other urinary symptoms. She presents with her mother.          History provided by:  Patient and parent (Mother)   used: No    Abdominal Pain  Associated symptoms: vomiting    Associated symptoms: no chest pain, no chills, no constipation, no cough, no diarrhea, no dysuria, no fever, no  hematuria, no nausea, no shortness of breath and no sore throat        Prior to Admission Medications   Prescriptions Last Dose Informant Patient Reported? Taking?   FLUoxetine (PROzac) 20 mg capsule   Yes No   Si mg   Mel 0.25-35 MG-MCG per tablet   Yes No   Sig: Take 1 tablet by mouth daily   famotidine (PEPCID) 40 MG tablet 2024  No Yes   Sig: Take 1 tablet (40 mg total) by mouth daily at bedtime   hydrOXYzine HCL (ATARAX) 25 mg tablet   Yes No   Sig: TAKE 1 TABLET (25 MG TOTAL) BY MOUTH 2 (TWO) TIMES A DAY AS NEEDED FOR ANXIETY (INSOMNIA).   loperamide (IMODIUM A-D) 2 MG tablet   No No   Sig: Take 1 tablet (2 mg total) by mouth 4 (four) times a day as needed for diarrhea   mirtazapine (REMERON) 7.5 MG tablet   Yes No   Sig: Take 7.5 mg by mouth daily as needed   omeprazole (PriLOSEC) 40 MG capsule   No No   Sig: Take 1 capsule (40 mg total) by mouth daily   ondansetron (ZOFRAN) 4 mg tablet 4/15/2024  No Yes   Sig: Take 1 tablet (4 mg total) by mouth every 8 (eight) hours as needed for nausea or vomiting   propranolol (INDERAL) 10 mg tablet   Yes No   Sig: TAKE 1 TABLET BY MOUTH 2 TIMES A DAY AS NEEDED (ANXIETY).      Facility-Administered Medications: None       Past Medical History:   Diagnosis Date    Anxiety     Depression     GERD (gastroesophageal reflux disease)        History reviewed. No pertinent surgical history.    History reviewed. No pertinent family history.  I have reviewed and agree with the history as documented.    E-Cigarette/Vaping    E-Cigarette Use Never User      E-Cigarette/Vaping Substances    Nicotine No     THC No     CBD No     Flavoring No     Other No     Unknown No      Social History     Tobacco Use    Smoking status: Never    Smokeless tobacco: Never   Vaping Use    Vaping status: Never Used   Substance Use Topics    Alcohol use: Yes     Alcohol/week: 1.0 standard drink of alcohol     Types: 1 Cans of beer per week    Drug use: Never       Review of Systems    Constitutional:  Negative for chills and fever.   HENT:  Negative for congestion, ear pain, rhinorrhea and sore throat.    Eyes:  Negative for pain and visual disturbance.   Respiratory:  Negative for cough and shortness of breath.    Cardiovascular:  Negative for chest pain and palpitations.   Gastrointestinal:  Positive for abdominal pain and vomiting. Negative for constipation, diarrhea and nausea.        Belching   Genitourinary:  Negative for dysuria, frequency, hematuria and urgency.   Musculoskeletal:  Negative for arthralgias and back pain.   Skin:  Negative for color change and rash.   Neurological:  Negative for dizziness, seizures, syncope, weakness, light-headedness and headaches.   Psychiatric/Behavioral:  Negative for agitation and confusion.        Physical Exam  Physical Exam  Vitals and nursing note reviewed.   Constitutional:       General: She is not in acute distress.     Appearance: She is well-developed. She is obese. She is not ill-appearing.   HENT:      Head: Normocephalic and atraumatic.   Eyes:      Conjunctiva/sclera: Conjunctivae normal.   Cardiovascular:      Rate and Rhythm: Normal rate and regular rhythm.      Heart sounds: No murmur heard.  Pulmonary:      Effort: Pulmonary effort is normal. No respiratory distress.      Breath sounds: Normal breath sounds. No wheezing, rhonchi or rales.   Abdominal:      General: Abdomen is flat.      Palpations: Abdomen is soft.      Tenderness: There is abdominal tenderness in the right upper quadrant. There is no guarding or rebound.   Musculoskeletal:         General: No swelling.      Cervical back: Neck supple.   Skin:     General: Skin is warm and dry.      Capillary Refill: Capillary refill takes less than 2 seconds.   Neurological:      General: No focal deficit present.      Mental Status: She is alert and oriented to person, place, and time.   Psychiatric:         Mood and Affect: Mood normal.         Vital Signs  ED Triage Vitals  [04/15/24 1114]   Temperature Pulse Respirations Blood Pressure SpO2   98.4 °F (36.9 °C) 92 18 136/97 98 %      Temp Source Heart Rate Source Patient Position - Orthostatic VS BP Location FiO2 (%)   Temporal Monitor Sitting Left arm --      Pain Score       3           Vitals:    04/15/24 1114 04/15/24 1200 04/15/24 1315   BP: 136/97 131/98 133/97   Pulse: 92 75 71   Patient Position - Orthostatic VS: Sitting Lying Lying         Visual Acuity      ED Medications  Medications   ketorolac (TORADOL) injection 15 mg (15 mg Intravenous Given 4/15/24 1155)   aluminum-magnesium hydroxide-simethicone (MAALOX) oral suspension 30 mL (30 mL Oral Given 4/15/24 1155)   iohexol (OMNIPAQUE) 350 MG/ML injection (MULTI-DOSE) 100 mL (100 mL Intravenous Given 4/15/24 1242)   ondansetron (ZOFRAN) injection 4 mg (4 mg Intravenous Given 4/15/24 1339)       Diagnostic Studies  Results Reviewed       Procedure Component Value Units Date/Time    Comprehensive metabolic panel [[id number]] Collected: 04/15/24 1129    Lab Status: Final result Specimen: Blood from Arm, Right Updated: 04/15/24 1158     Sodium 136 mmol/L      Potassium 3.8 mmol/L      Chloride 103 mmol/L      CO2 23 mmol/L      ANION GAP 10 mmol/L      BUN 9 mg/dL      Creatinine 0.84 mg/dL      Glucose 110 mg/dL      Calcium 8.8 mg/dL      AST 16 U/L      ALT 15 U/L      Alkaline Phosphatase 64 U/L      Total Protein 7.0 g/dL      Albumin 4.0 g/dL      Total Bilirubin 0.36 mg/dL      eGFR 98 ml/min/1.73sq m     Narrative:      National Kidney Disease Foundation guidelines for Chronic Kidney Disease (CKD):     Stage 1 with normal or high GFR (GFR > 90 mL/min/1.73 square meters)    Stage 2 Mild CKD (GFR = 60-89 mL/min/1.73 square meters)    Stage 3A Moderate CKD (GFR = 45-59 mL/min/1.73 square meters)    Stage 3B Moderate CKD (GFR = 30-44 mL/min/1.73 square meters)    Stage 4 Severe CKD (GFR = 15-29 mL/min/1.73 square meters)    Stage 5 End Stage CKD (GFR <15 mL/min/1.73 square  meters)  Note: GFR calculation is accurate only with a steady state creatinine    Lipase [[id number]]  (Normal) Collected: 04/15/24 1129    Lab Status: Final result Specimen: Blood from Arm, Right Updated: 04/15/24 1158     Lipase 17 u/L     Urine Microscopic [[id number]]  (Normal) Collected: 04/15/24 1124    Lab Status: Final result Specimen: Urine, Clean Catch Updated: 04/15/24 1143     RBC, UA None Seen /hpf      WBC, UA 0-1 /hpf      Epithelial Cells Occasional /hpf      Bacteria, UA None Seen /hpf     UA w Reflex to Microscopic w Reflex to Culture [[id number]]  (Abnormal) Collected: 04/15/24 1124    Lab Status: Final result Specimen: Urine, Clean Catch Updated: 04/15/24 1136     Color, UA Yellow     Clarity, UA Clear     Specific Gravity, UA 1.010     pH, UA 6.5     Leukocytes, UA Negative     Nitrite, UA Negative     Protein, UA Trace mg/dl      Glucose, UA Negative mg/dl      Ketones, UA Negative mg/dl      Urobilinogen, UA 0.2 E.U./dl      Bilirubin, UA Negative     Occult Blood, UA Negative    CBC and differential [[id number]]  (Abnormal) Collected: 04/15/24 1129    Lab Status: Final result Specimen: Blood from Arm, Right Updated: 04/15/24 1136     WBC 11.47 Thousand/uL      RBC 4.86 Million/uL      Hemoglobin 12.8 g/dL      Hematocrit 40.0 %      MCV 82 fL      MCH 26.3 pg      MCHC 32.0 g/dL      RDW 13.5 %      MPV 10.6 fL      Platelets 384 Thousands/uL      nRBC 0 /100 WBCs      Segmented % 59 %      Immature Grans % 1 %      Lymphocytes % 30 %      Monocytes % 7 %      Eosinophils Relative 2 %      Basophils Relative 1 %      Absolute Neutrophils 6.86 Thousands/µL      Absolute Immature Grans 0.06 Thousand/uL      Absolute Lymphocytes 3.43 Thousands/µL      Absolute Monocytes 0.80 Thousand/µL      Eosinophils Absolute 0.26 Thousand/µL      Basophils Absolute 0.06 Thousands/µL     POCT pregnancy, urine [[id number]]  (Normal) Resulted: 04/15/24 1128    Lab Status: Final result Specimen: Urine Updated:  04/15/24 1129     EXT Preg Test, Ur Negative     Control Valid                   CT Abdomen pelvis with contrast   Final Result by Melissa Ross MD (04/15 1332)      1.  No acute abdominopelvic CT abnormality.   2.  3 cm left ovarian benign-appearing cyst         Workstation performed: [id number]                    Procedures  Procedures         ED Course  ED Course as of 04/15/24 1405   Mon Apr 15, 2024   1118 Vital signs reviewed and within normal limits.   1136 CBC and differential(!)  Mild leukocytosis. No anemia or platelet abnormality.   1136 PREGNANCY TEST URINE: Negative  Negative for pregnancy.   1136 UA w Reflex to Microscopic w Reflex to Culture(!)  Unremarkable for UTI.   1205 Comprehensive metabolic panel  Electrolytes within normal limits. No SAWYER or glucose abnormality. No transaminitis.   1205 LIPASE: 17  Lipase within normal limits.   1235 Patient reports feeling much better after the Toradol and Maalox. She is no longer belching.  Awaiting CT scan. Tolerating p.o. without difficulty.   1240 Patient back at CT scan.   1340 Patient asked for something for nausea. Will give IV Zofran 4mg for this.   1340 CT Abdomen pelvis with contrast  IMPRESSION:  1.  No acute abdominopelvic CT abnormality.  2.  3 cm left ovarian benign-appearing cyst   1355 Went over results with patient and mother. She is feeling much better and would like to go home.  Will discharge home with ambulatory referral for gastroenterology and advise her to continue taking Maalox and Zofran as needed.                               SBIRT 22yo+      Flowsheet Row Most Recent Value   Initial Alcohol Screen: US AUDIT-C     1. How often do you have a drink containing alcohol? 0 Filed at: 04/15/2024 1116   2. How many drinks containing alcohol do you have on a typical day you are drinking?  0 Filed at: 04/15/2024 1116   3a. Male UNDER 65: How often do you have five or more drinks on one occasion? 0 Filed at: 04/15/2024 1116   3b. FEMALE Any Age,  or MALE 65+: How often do you have 4 or more drinks on one occassion? 0 Filed at: 04/15/2024 1116   Audit-C Score 0 Filed at: 04/15/2024 1116   [place]: How many times in the past year have you...    Used an illegal drug or used a prescription medication for non-medical reasons? Never Filed at: 04/15/2024 1116                      Medical Decision Making  Patient is a 23-year-old female with relevant past medical history of anxiety, depression, GERD, gastritis, diarrhea, and left ovarian cyst presenting with abdominal pain x 1 day. She is nontoxic-appearing with normal vital signs and has tenderness in her right upper quadrant. No pelvic pain.   Abdominal labs and CT abdomen/pelvis with contrast due to acute abdominal pain, vomiting, and right-sided tenderness.  See ED course for interpretation of labs, imaging, and further medical decision making.   Maalox 30 mL oral suspension, Toradol 15 mg IV, and IV fluids for her abdominal pain and belching. Zofran 4 mg IV for nausea. She felt much better after these treatments.  Dispo: Patient discharged home with strict return precautions. Advised patient to return to the nearest emergency room if she has new or worsening symptoms. Advised patient to follow-up with her family doctor and proceed with the gastroenterology referral. Patient and mother are satisfied with care and agree with management and plan.       Amount and/or Complexity of Data Reviewed  External Data Reviewed: labs, radiology and notes.  Labs: ordered. Decision-making details documented in ED Course.  Radiology: ordered. Decision-making details documented in ED Course.    Risk  OTC drugs.  Prescription drug management.             Disposition  Final diagnoses:   Abdominal pain   Nausea and vomiting     Time reflects when diagnosis was documented in both MDM as applicable and the Disposition within this note       Time User Action Codes Description Comment    4/15/2024  1:41 PM Domenic Leonardo Add [R10.9]  Abdominal pain     4/15/2024  1:42 PM Domenic Leonardo Add [R11.2] Nausea and vomiting           ED Disposition       ED Disposition   Discharge    Condition   Stable    Date/Time   Mon Apr 15, 2024 1338    Comment   Jessica Keyes discharge to home/self care.                   Follow-up Information       Follow up With Specialties Details Why Contact Info Additional Information    formerly [facility] Emergency Department Emergency Medicine Go to  If symptoms worsen [address] [facility] [id number] [phone] formerly [facility] Emergency Department, [address] [facility] [facility], [place], [place] [address]    SUSSY Peterson Nurse Practitioner Schedule an appointment as soon as possible for a visit  As needed [address] [facility]  [address]  [facility] [address] [phone]       [facility] Schedule an appointment as soon as possible for a visit   [address] [address]  [phone] [facility] [address]  [place], [address], [phone]            Patient's Medications   Discharge Prescriptions    No medications on file           PDMP Review       None            ED Provider  Electronically Signed by             Domenic Leonardo PA-C  04/15/24 5896       Domenic Leonardo PA-C  04/15/24 1611

## 2024-04-15 NOTE — Clinical Note
Jessica Keyes was seen and treated in our emergency department on 4/15/2024.    No restrictions            Diagnosis: Abdominal pain    Jessica  may return to work on return date.    She may return on this date: 04/16/2024         If you have any questions or concerns, please don't hesitate to call.      Domenic Leonardo PA-C    ______________________________           _______________          _______________  Hospital Representative                              Date                                Time

## 2024-05-01 DIAGNOSIS — K92.0 HEMATEMESIS WITH NAUSEA: ICD-10-CM

## 2024-05-01 DIAGNOSIS — R11.14 BILIOUS VOMITING WITH NAUSEA: ICD-10-CM

## 2024-05-01 DIAGNOSIS — K29.70 GASTRITIS: ICD-10-CM

## 2024-05-01 RX ORDER — OMEPRAZOLE 40 MG/1
40 CAPSULE, DELAYED RELEASE ORAL DAILY
Qty: 90 CAPSULE | Refills: 1 | Status: SHIPPED | OUTPATIENT
Start: 2024-05-01

## 2024-05-14 ENCOUNTER — TELEPHONE (OUTPATIENT)
Age: 24
End: 2024-05-14

## 2024-05-14 ENCOUNTER — NURSE TRIAGE (OUTPATIENT)
Age: 24
End: 2024-05-14

## 2024-05-14 NOTE — TELEPHONE ENCOUNTER
Patients GI provider:  Dr. Hancock    Number to return call: [phone]    Reason for call: Pt calling for an absent note for missing work today. Pt has been home sick and not able to get a sooner appt. Pt not willing to go to ED again.     Scheduled procedure/appointment date if applicable: Apt 5/17/24

## 2024-05-14 NOTE — TELEPHONE ENCOUNTER
If pt is unable to keep food down up until today, she should be seen in the ED for this. Is she able to go?? Thanks!

## 2024-05-14 NOTE — TELEPHONE ENCOUNTER
Tried to contact pt to give provider recommendation for ED eval, unable to leave voicemail due to mailbox being full.

## 2024-05-14 NOTE — TELEPHONE ENCOUNTER
"Pt. Stating she has had nausea and vomiting that has gotten worse yesterday, pt. Ate yesterday and states she vomited the undigested food today, pt. Taking Zofran with no relief, pt. Is able to keep fluids down but not food, pt. Asking for work note for today, has office appt this 5/17/24, please advise     You can email note to [email or web address]      Reason for Disposition   Vomiting    Answer Assessment - Initial Assessment Questions  1. VOMITING SEVERITY: \"How many times have you vomited in the past 24 hours?\"      - MILD:  1 - 2 times/day     - MODERATE: 3 - 5 times/day, decreased oral intake without significant weight loss or symptoms of dehydration     - SEVERE: 6 or more times/day, vomits everything or nearly everything, with significant weight loss, symptoms of dehydration       Mild to moderate   2. ONSET: \"When did the vomiting begin?\"       Ongoing, worse yesterday   3. FLUIDS: \"What fluids or food have you vomited up today?\" \"Have you been able to keep any fluids down?\"      Yes   4. ABDOMINAL PAIN: \"Are your having any abdominal pain?\" If yes : \"How bad is it and what does it feel like?\" (e.g., crampy, dull, intermittent, constant)       Pain to RUQ  5. DIARRHEA: \"Is there any diarrhea?\" If Yes, ask: \"How many times today?\"       Denies   6. CONTACTS: \"Is there anyone else in the family with the same symptoms?\"       Denies   7. CAUSE: \"What do you think is causing your vomiting?\"      Unsure   8. HYDRATION STATUS: \"Any signs of dehydration?\" (e.g., dry mouth [not only dry lips], too weak to stand) \"When did you last urinate?\"      Denies   9. OTHER SYMPTOMS: \"Do you have any other symptoms?\" (e.g., fever, headache, vertigo, vomiting blood or coffee grounds, recent head injury)      Noticed a small blood clot in vomit this morning   10. PREGNANCY: \"Is there any chance you are pregnant?\" \"When was your last menstrual period?\"        Denies    Protocols used: Vomiting-ADULT-OH    "

## 2024-08-02 DIAGNOSIS — K29.70 GASTRITIS: ICD-10-CM

## 2024-08-02 DIAGNOSIS — K92.0 HEMATEMESIS WITH NAUSEA: ICD-10-CM

## 2024-08-02 DIAGNOSIS — R11.14 BILIOUS VOMITING WITH NAUSEA: ICD-10-CM

## 2024-08-02 RX ORDER — FAMOTIDINE 40 MG/1
40 TABLET, FILM COATED ORAL
Qty: 90 TABLET | Refills: 1 | Status: SHIPPED | OUTPATIENT
Start: 2024-08-02

## 2024-11-09 DIAGNOSIS — K29.70 GASTRITIS: ICD-10-CM

## 2024-11-09 DIAGNOSIS — R11.14 BILIOUS VOMITING WITH NAUSEA: ICD-10-CM

## 2024-11-09 DIAGNOSIS — K92.0 HEMATEMESIS WITH NAUSEA: ICD-10-CM

## 2024-11-11 RX ORDER — OMEPRAZOLE 40 MG/1
40 CAPSULE, DELAYED RELEASE ORAL DAILY
Qty: 90 CAPSULE | Refills: 1 | Status: SHIPPED | OUTPATIENT
Start: 2024-11-11

## 2025-02-14 DIAGNOSIS — R11.14 BILIOUS VOMITING WITH NAUSEA: ICD-10-CM

## 2025-02-14 DIAGNOSIS — K29.70 GASTRITIS: ICD-10-CM

## 2025-02-14 DIAGNOSIS — K92.0 HEMATEMESIS WITH NAUSEA: ICD-10-CM

## 2025-02-14 RX ORDER — FAMOTIDINE 40 MG/1
40 TABLET, FILM COATED ORAL
Qty: 90 TABLET | Refills: 1 | Status: SHIPPED | OUTPATIENT
Start: 2025-02-14

## 2025-04-11 ENCOUNTER — HOSPITAL ENCOUNTER (EMERGENCY)
Facility: HOSPITAL | Age: 25
Discharge: HOME | End: 2025-04-11
Attending: FAMILY MEDICINE
Payer: COMMERCIAL

## 2025-04-11 VITALS
HEART RATE: 84 BPM | DIASTOLIC BLOOD PRESSURE: 84 MMHG | BODY MASS INDEX: 38.98 KG/M2 | TEMPERATURE: 98.9 F | RESPIRATION RATE: 18 BRPM | HEIGHT: 63 IN | SYSTOLIC BLOOD PRESSURE: 129 MMHG | WEIGHT: 220 LBS | OXYGEN SATURATION: 99 %

## 2025-04-11 DIAGNOSIS — R45.851 SUICIDAL IDEATIONS: ICD-10-CM

## 2025-04-11 DIAGNOSIS — F32.A DEPRESSION: Primary | ICD-10-CM

## 2025-04-11 LAB
AMPHETAMINES SERPL QL SCN: NEGATIVE
BARBITURATES UR QL: NEGATIVE
BENZODIAZ UR QL: NEGATIVE
COCAINE UR QL: NEGATIVE
ETHANOL EXG-MCNC: 0 MG/DL
EXT PREGNANCY TEST URINE: NEGATIVE
EXT. CONTROL: NORMAL
FENTANYL UR QL SCN: NEGATIVE
HYDROCODONE UR QL SCN: NEGATIVE
METHADONE UR QL: NEGATIVE
OPIATES UR QL SCN: NEGATIVE
OXYCODONE+OXYMORPHONE UR QL SCN: NEGATIVE
PCP UR QL: NEGATIVE
THC UR QL: POSITIVE

## 2025-04-11 PROCEDURE — 81025 URINE PREGNANCY TEST: CPT | Performed by: FAMILY MEDICINE

## 2025-04-11 PROCEDURE — 99285 EMERGENCY DEPT VISIT HI MDM: CPT | Performed by: FAMILY MEDICINE

## 2025-04-11 PROCEDURE — 82075 ASSAY OF BREATH ETHANOL: CPT | Performed by: FAMILY MEDICINE

## 2025-04-11 PROCEDURE — 80307 DRUG TEST PRSMV CHEM ANLYZR: CPT | Performed by: FAMILY MEDICINE

## 2025-04-11 PROCEDURE — 99284 EMERGENCY DEPT VISIT MOD MDM: CPT

## 2025-04-11 RX ORDER — ARIPIPRAZOLE 2 MG/1
2 TABLET ORAL DAILY
COMMUNITY

## 2025-04-11 RX ORDER — PANTOPRAZOLE SODIUM 40 MG/1
40 TABLET, DELAYED RELEASE ORAL DAILY
COMMUNITY

## 2025-04-11 NOTE — ED NOTES
Pt's mother arrived stating her sister just passed (pt's aunt), and that everyone was acting up. Mother does not feel pt is unsafe to be discharged home. Pt lives with her mother and the father.

## 2025-04-11 NOTE — DISCHARGE INSTRUCTIONS
Discharge and Safety Plan    This writer discussed the patients current presentation and recommended discharge plan with .  They agree with the patient being discharged at this time.     The patient was Instructed to follow up with their psychiatrist.   The patient was provided with outpatient resources for therapy, support groups.  This writer and the patient completed a safety plan.  The patient was provided with a copy of their safety plan with encouragement to utilize the plan following discharge.   In addition, the patient was instructed to call local [facility] crisis, other crisis services, 911 or to go to the nearest ER immediately if their situation changes at any time.     This writer discussed discharge plans with the patient and family/mother, who agrees with and understands the discharge plans.         SAFETY PLAN  Warning Signs (thoughts, images, mood, behavior, situations) of a potential crisis: suicidal thoughts, mood swings, feeling hopelessness, overwhelmed with emotions, feeling angry      Coping Skills (what can I do to take my mind off the problem, or to keep myself safe): take deep breaths, county to 10, talk to someone, take a walk outside, take a warm shower.      Outside Support (who can I reach out to for support and help): fiance, family        National Suicide Prevention Hotline:  [address] [place] [address] [phone] - [facility] [phone] - [facility] Crisis/Mobile Crisis   [phone] - [facility] Crisis   [facility]: [phone]  [facility]: [phone]   [facility] [phone] - Crisis   [facility] [phone] - Crisis     [facility] [phone] - Crisis   [facility] [phone] - Crisis   [phone] - Peer Support Talk Line (1-9pm daily)  [phone] - Teen Support Talk Line (1-9pm daily)  [phone] - [facility]   [facility] [phone]- Crisis    [facility] [phone] - Crisis   [facility] [phone] - Crisis    [facility])  [phone] - [facility] Crisis
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
## 2025-04-11 NOTE — ED PROVIDER NOTES
Time reflects when diagnosis was documented in both MDM as applicable and the Disposition within this note       Time User Action Codes Description Comment    4/11/2025  2:08 PM Moe Alberts Add [F32.A] Depression     4/11/2025  2:08 PM Moe Alberts Add [R45.851] Suicidal ideations           ED Disposition       ED Disposition   Discharge    Condition   Stable    Date/Time   Fri Apr 11, 2025  3:38 PM    Comment   Jessica Keyes should be transferred out to  and has been medically cleared.               Assessment & Plan       Medical Decision Making  Amount and/or Complexity of Data Reviewed  Labs: ordered.    Risk  Decision regarding hospitalization.    24-year-old female presented to ED with the complaint of depression and suicidal thoughts.  Patient states that her aunt passed away this morning and she is not able to cope.  Patient that she does not have any plan he is having trouble coping.    Pt seen by crisis  pt is safe for discharge . Mother at bed side feels comfortable taking her home    ED Course as of 04/11/25 1557   Fri Apr 11, 2025   1346 Patient is medically clear for inpatient behavioral unit admission.       Medications - No data to display    ED Risk Strat Scores                    No data recorded        SBIRT 22yo+      Flowsheet Row Most Recent Value   Initial Alcohol Screen: US AUDIT-C     1. How often do you have a drink containing alcohol? 0 Filed at: 04/11/2025 1302   2. How many drinks containing alcohol do you have on a typical day you are drinking?  0 Filed at: 04/11/2025 1302   3b. FEMALE Any Age, or MALE 65+: How often do you have 4 or more drinks on one occassion? 0 Filed at: 04/11/2025 1302   Audit-C Score 0 Filed at: 04/11/2025 1302   [place]: How many times in the past year have you...    Used an illegal drug or used a prescription medication for non-medical reasons? Never Filed at: 04/11/2025 1302                            History of Present Illness       Chief Complaint   Patient  presents with    Depression     According to the patient, her aunt had unexpectedly passed away and feels more depressed.  Patient denies SI/HI at this time.        Past Medical History:   Diagnosis Date    Anxiety     Depression     GERD (gastroesophageal reflux disease)       History reviewed. No pertinent surgical history.   History reviewed. No pertinent family history.   Social History     Tobacco Use    Smoking status: Never    Smokeless tobacco: Never   Vaping Use    Vaping status: Some Days   Substance Use Topics    Alcohol use: Yes     Alcohol/week: 1.0 standard drink of alcohol     Types: 1 Cans of beer per week    Drug use: Never      E-Cigarette/Vaping    E-Cigarette Use Current Some Day User       E-Cigarette/Vaping Substances    Nicotine No     THC No     CBD No     Flavoring No     Other No     Unknown No       I have reviewed and agree with the history as documented.       Depression  Presenting symptoms: suicidal thoughts    Associated symptoms: anxiety    Associated symptoms: no abdominal pain, no chest pain and no headaches        Review of Systems   Constitutional:  Negative for chills and fever.   HENT:  Negative for rhinorrhea and sore throat.    Eyes:  Negative for visual disturbance.   Respiratory:  Negative for cough and shortness of breath.    Cardiovascular:  Negative for chest pain and leg swelling.   Gastrointestinal:  Negative for abdominal pain, diarrhea, nausea and vomiting.   Genitourinary:  Negative for dysuria.   Musculoskeletal:  Negative for back pain and myalgias.   Skin:  Negative for rash.   Neurological:  Negative for dizziness and headaches.   Psychiatric/Behavioral:  Positive for depression and suicidal ideas. Negative for confusion. The patient is nervous/anxious.    All other systems reviewed and are negative.          Objective       ED Triage Vitals [04/11/25 1258]   Temperature Pulse Blood Pressure Respirations SpO2 Patient Position - Orthostatic VS   97.8 °F (36.6 °C)  80 144/87 18 98 % Lying      Temp Source Heart Rate Source BP Location FiO2 (%) Pain Score    Temporal Monitor Right arm -- 7      Vitals      Date and Time Temp Pulse SpO2 Resp BP Pain Score FACES Pain Rating User   04/11/25 1538 99.1 °F (37.3 °C) 85 99 % 18 129/84 -- -- AC   04/11/25 1258 97.8 °F (36.6 °C) 80 98 % 18 144/87 7 -- [facility] [place]            Physical Exam  Vitals and nursing note reviewed.   Constitutional:       Appearance: She is well-developed.   HENT:      Head: Normocephalic and atraumatic.      Right Ear: External ear normal.      Left Ear: External ear normal.      Nose: Nose normal.      Mouth/Throat:      Mouth: Mucous membranes are moist.      Pharynx: No oropharyngeal exudate.   Eyes:      General: No scleral icterus.        Right eye: No discharge.         Left eye: No discharge.      Conjunctiva/sclera: Conjunctivae normal.      Pupils: Pupils are equal, round, and reactive to light.   Cardiovascular:      Rate and Rhythm: Normal rate and regular rhythm.      Pulses: Normal pulses.      Heart sounds: Normal heart sounds.   Pulmonary:      Effort: Pulmonary effort is normal. No respiratory distress.      Breath sounds: Normal breath sounds. No wheezing.   Abdominal:      General: Bowel sounds are normal.      Palpations: Abdomen is soft.   Musculoskeletal:         General: Normal range of motion.      Cervical back: Normal range of motion and neck supple.   Lymphadenopathy:      Cervical: No cervical adenopathy.   Skin:     General: Skin is warm and dry.      Capillary Refill: Capillary refill takes less than 2 seconds.   Neurological:      General: No focal deficit present.      Mental Status: She is alert and oriented to person, place, and time.   Psychiatric:         Mood and Affect: Mood is anxious and depressed.         Behavior: Behavior normal.         Thought Content: Thought content includes suicidal ideation. Thought content does not include suicidal plan.         Results Reviewed        Procedure Component Value Units Date/Time    Rapid drug screen, urine [[id number]]  (Abnormal) Collected: 25    Lab Status: Final result Specimen: Urine, Clean Catch Updated: 25 1346     Amph/Meth UR Negative     Barbiturate Ur Negative     Benzodiazepine Urine Negative     Cocaine Urine Negative     Methadone Urine Negative     Opiate Urine Negative     PCP Ur Negative     THC Urine Positive     Oxycodone Urine Negative     Fentanyl Urine Negative     HYDROCODONE URINE Negative    Narrative:      Presumptive report. If requested, specimen will be sent to reference lab for confirmation.  FOR MEDICAL PURPOSES ONLY.   IF CONFIRMATION NEEDED PLEASE CONTACT THE LAB WITHIN 5 DAYS.    Drug Screen Cutoff Levels:  AMPHETAMINE/METHAMPHETAMINES  1000 ng/mL  BARBITURATES     200 ng/mL  BENZODIAZEPINES     200 ng/mL  COCAINE      300 ng/mL  METHADONE      300 ng/mL  OPIATES      300 ng/mL  PHENCYCLIDINE     25 ng/mL  THC       50 ng/mL  OXYCODONE      100 ng/mL  FENTANYL      5 ng/mL  HYDROCODONE     300 ng/mL    POCT alcohol breath test [[id number]]  (Normal) Resulted: 25    Lab Status: Final result Updated: 25     EXTBreath Alcohol 0.000    POCT pregnancy, urine [[id number]]  (Normal) Collected: 25    Lab Status: Final result Specimen: Urine Updated: 25     EXT Preg Test, Ur Negative     Control Valid            No orders to display       Procedures    ED Medication and Procedure Management   Prior to Admission Medications   Prescriptions Last Dose Informant Patient Reported? Taking?   ARIPiprazole (ABILIFY) 2 mg tablet   Yes Yes   Sig: Take 2 mg by mouth daily   FLUoxetine (PROzac) 20 mg capsule   Yes No   Si mg Patient takes 50mg daily   Mel 0.25-35 MG-MCG per tablet   Yes No   Sig: Take 1 tablet by mouth daily   famotidine (PEPCID) 40 MG tablet   No No   Sig: TAKE 1 TABLET BY MOUTH DAILY AT BEDTIME   Patient taking differently: Take 40 mg by mouth 2 (two)  times a day   hydrOXYzine HCL (ATARAX) 25 mg tablet   Yes No   Sig: TAKE 1 TABLET (25 MG TOTAL) BY MOUTH 2 (TWO) TIMES A DAY AS NEEDED FOR ANXIETY (INSOMNIA).   loperamide (IMODIUM A-D) 2 MG tablet   No No   Sig: Take 1 tablet (2 mg total) by mouth 4 (four) times a day as needed for diarrhea   mirtazapine (REMERON) 7.5 MG tablet Not Taking  Yes No   Sig: Take 7.5 mg by mouth daily as needed   Patient not taking: Reported on 4/11/2025   omeprazole (PriLOSEC) 40 MG capsule Not Taking  No No   Sig: TAKE 1 CAPSULE (40 MG TOTAL) BY MOUTH DAILY.   Patient not taking: Reported on 4/11/2025   ondansetron (ZOFRAN) 4 mg tablet   No No   Sig: Take 1 tablet (4 mg total) by mouth every 8 (eight) hours as needed for nausea or vomiting   pantoprazole (PROTONIX) 40 mg tablet   Yes Yes   Sig: Take 40 mg by mouth daily   propranolol (INDERAL) 10 mg tablet Not Taking  Yes No   Sig: TAKE 1 TABLET BY MOUTH 2 TIMES A DAY AS NEEDED (ANXIETY).   Patient not taking: Reported on 4/11/2025      Facility-Administered Medications: None     Patient's Medications   Discharge Prescriptions    No medications on file     No discharge procedures on file.  ED SEPSIS DOCUMENTATION   Time reflects when diagnosis was documented in both MDM as applicable and the Disposition within this note       Time User Action Codes Description Comment    4/11/2025  2:08 PM Moe Alberts Add [F32.A] Depression     4/11/2025  2:08 PM Moe Alberts Add [R45.851] Suicidal ideations                  Moe Alberts MD  04/11/25 2610

## 2025-04-11 NOTE — ED NOTES
Discharge and Safety Plan    This writer discussed the patients current presentation and recommended discharge plan with .  They agree with the patient being discharged at this time.     The patient was Instructed to follow up with their psychiatrist.   The patient was provided with outpatient resources for therapy, support groups.  This writer and the patient completed a safety plan.  The patient was provided with a copy of their safety plan with encouragement to utilize the plan following discharge.   In addition, the patient was instructed to call local [facility] crisis, other crisis services, 911 or to go to the nearest ER immediately if their situation changes at any time.     This writer discussed discharge plans with the patient and family/mother, who agrees with and understands the discharge plans.         SAFETY PLAN  Warning Signs (thoughts, images, mood, behavior, situations) of a potential crisis: suicidal thoughts, mood swings, feeling hopelessness, overwhelmed with emotions, feeling angry      Coping Skills (what can I do to take my mind off the problem, or to keep myself safe): take deep breaths, county to 10, talk to someone, take a walk outside, take a warm shower.      Outside Support (who can I reach out to for support and help): fiance, family        National Suicide Prevention Hotline:  [address] [place] [address] [phone] - [facility] [phone] - [facility] Crisis/Mobile Crisis   [phone] - [facility] Crisis   [facility]: [phone]  [facility]: [phone]   [facility] [place] [phone] - Crisis   [facility] [phone] - Crisis     [facility] [phone] - Crisis   [facility] [phone] - Crisis   [phone] - Peer Support Talk Line (1-9pm daily)  [phone] - Teen Support Talk Line (1-9pm daily)  [phone] - [facility]   [facility] [phone]- Crisis    [facility] [phone] - Crisis   [facility] [phone] - Crisis    [facility])  [phone] - [facility] Crisis

## 2025-08-06 DIAGNOSIS — K29.70 GASTRITIS: ICD-10-CM

## 2025-08-06 DIAGNOSIS — R11.14 BILIOUS VOMITING WITH NAUSEA: ICD-10-CM

## 2025-08-06 DIAGNOSIS — K92.0 HEMATEMESIS WITH NAUSEA: ICD-10-CM

## 2025-08-06 RX ORDER — FAMOTIDINE 40 MG/1
40 TABLET, FILM COATED ORAL
Qty: 90 TABLET | Refills: 1 | OUTPATIENT
Start: 2025-08-06